# Patient Record
Sex: FEMALE | Race: BLACK OR AFRICAN AMERICAN | NOT HISPANIC OR LATINO | Employment: PART TIME | URBAN - METROPOLITAN AREA
[De-identification: names, ages, dates, MRNs, and addresses within clinical notes are randomized per-mention and may not be internally consistent; named-entity substitution may affect disease eponyms.]

---

## 2023-10-11 DIAGNOSIS — E11.69 TYPE 2 DIABETES MELLITUS WITH OTHER SPECIFIED COMPLICATION, WITHOUT LONG-TERM CURRENT USE OF INSULIN (MULTI): ICD-10-CM

## 2023-10-11 DIAGNOSIS — E11.69 TYPE 2 DIABETES MELLITUS WITH OTHER SPECIFIED COMPLICATION, WITH LONG-TERM CURRENT USE OF INSULIN (MULTI): Primary | ICD-10-CM

## 2023-10-11 DIAGNOSIS — Z79.4 TYPE 2 DIABETES MELLITUS WITH OTHER SPECIFIED COMPLICATION, WITH LONG-TERM CURRENT USE OF INSULIN (MULTI): Primary | ICD-10-CM

## 2023-10-12 RX ORDER — INSULIN GLARGINE 100 [IU]/ML
INJECTION, SOLUTION SUBCUTANEOUS
Qty: 25 ML | Refills: 3 | Status: SHIPPED | OUTPATIENT
Start: 2023-10-12 | End: 2024-01-25 | Stop reason: SDUPTHER

## 2024-01-08 ENCOUNTER — APPOINTMENT (OUTPATIENT)
Dept: OBSTETRICS AND GYNECOLOGY | Facility: CLINIC | Age: 39
End: 2024-01-08
Payer: COMMERCIAL

## 2024-01-08 ENCOUNTER — TELEPHONE (OUTPATIENT)
Dept: OBSTETRICS AND GYNECOLOGY | Facility: CLINIC | Age: 39
End: 2024-01-08
Payer: COMMERCIAL

## 2024-01-09 NOTE — PROGRESS NOTES
"HPI    37yo pmh dm2 (dx 2020), dyslipidemia, depression/anxiety, headaches presents in follow up.  Hasn't been seen for over 2 years.  A1c-10.1%.      No recent blood sugar tests.  No low sugars. Pt is doing better with carb control in the diet. Pt is not all that active.           Pt taking metformin 500mg ER bid for diabetes, can't tolerate 4 pills/day, lantus up to 70 units qd. No glp-1/Sgt2-i as pt not using any contraception and is sexually active.           Was taking atorvastatin 40mg daily for lipids and tolerating , not currently taking.           Was taking diazide and amlodipine for htn, currently not taking.           Mood is stable, not seeing a counselor currently.           Pt sexually active, no contraception, not planning pregnancy. Pt had taken ocp in the past, would be open to trying this again.           Seeing neurology for headaches, was given rx for taper of steroids, pt has not started yet.      Current Outpatient Medications:     blood sugar diagnostic strip, every 12 hours., Disp: , Rfl:     insulin glargine (Lantus Solostar U-100 Insulin) 100 unit/mL (3 mL) pen, USE 50 UNITS SUBCUTANEOUS EVERY DAY 30, Disp: 25 mL, Rfl: 3    lancets misc, every 12 hours., Disp: , Rfl:     metFORMIN  mg 24 hr tablet, Take 2 tablets (1,000 mg) by mouth once every 24 hours., Disp: , Rfl:     Mirena 21 mcg/24 hours (8 yrs) 52 mg IUD, , Disp: , Rfl:     OneTouch Ultra Test strip, 1 each 2 times a day., Disp: , Rfl:     pen needle, diabetic 32 gauge x 5/32\" needle, once every 24 hours., Disp: , Rfl:       Allergies as of 01/10/2024    (No Known Allergies)         Review of Systems   Cardiology: Lightheadedness-denies.  Chest pain-denies.  Leg edema-denies.  Palpitations-denies.  Respiratory: Cough-denies. Shortness of breath-denies.  Wheezing-denies.  Gastroenterology: Constipation-denies.  Diarrhea-at times.  Heartburn-denies.  Endocrinology: Cold intolerance-denies.  Heat intolerance-denies.  " "Sweats-denies.  Neurology: Headache-denies.  Tremor-denies.  Neuropathy in extremities-at times.  Psychology: Low energy-positive.  Irritability-denies.  Sleep disturbances-denies.      /70   Pulse 85   Wt 112 kg (248 lb)   BMI 36.62 kg/m²       Labs:  Lab Results   Component Value Date    WBC 8.2 04/23/2021    NRBC 0 04/23/2021    RBC 4.39 04/23/2021    HGB 13.3 04/23/2021    HCT 41.1 04/23/2021     04/23/2021     Lab Results   Component Value Date    CALCIUM 9.0 04/23/2021     04/23/2021    K 3.9 04/23/2021     04/23/2021    CO2 28 04/23/2021    ANIONGAP 8 04/23/2021    BUN 7 (L) 04/23/2021    CREATININE 0.6 04/23/2021    UREACREAUR 11.7 04/23/2021    GLUCOSE 128 (H) 04/23/2021    EGFR 121 04/23/2021     No results found for: \"CHOL\", \"TRIG\", \"HDL\", \"LDLCALC\"  No results found for: \"MICROALBCREA\"  Lab Results   Component Value Date    TSH 1.36 04/23/2021     Lab Results   Component Value Date    WCRHQSHH98 300 04/23/2021     Lab Results   Component Value Date    HGBA1C 10.1 (A) 01/10/2024         Physical Exam   General Appearance: pleasant, cooperative, no acute distress  HEENT: no chemosis, no proptosis, no lid lag, no lid retraction  Neck: no lymphadenopathy, no thyromegaly, no dominant thyroid nodules  Heart: no murmurs, regular rate and rhythm, S1 and S2  Lungs: no wheezes, no rhonci, no rales  Extremities: no lower extremity swelling      Assessment/Plan   1. Type 2 diabetes mellitus with other specified complication, unspecified whether long term insulin use (CMS/[facility])  -A1c order and reviewed  -sugars reviewed    -stop metformin  -once you have your IUD you can start:  -glimepiride 4mg q am  -add mounjaro 2.5mg weekly for 4 weeks, then 5mg weekly thereafter,  Went over risk/benefits/warnings/how to inject    -start libre2 and use her iphone (can call for training if she needs it)    2. Depression/anxiety  -would benefit from working with psych again    3. Mixed " hyperlipidemia  -needs to go back on statin, can revisit soon as we have made many changes  -will need labs in the future      Follow Up:  reese HelmsD  3 months    Medical Decision Making  Complexity of problem: Chronic illness of diabetes mellitus uncontrolled, progressing  Data analyzed and reviewed: Reviewed prior notes, blood glucose data, labs including HgbA1c, lipids, serum chemistries.  Ordered tests.   Risk of complications and morbidities: Is definite because of use of insulin and risk of hypoglycemia.  Prescription medications reviewed and modifications made.  Compliance assessed.  Addressed social determinants of health including food insecurity.

## 2024-01-10 ENCOUNTER — OFFICE VISIT (OUTPATIENT)
Dept: ENDOCRINOLOGY | Facility: CLINIC | Age: 39
End: 2024-01-10
Payer: COMMERCIAL

## 2024-01-10 VITALS
SYSTOLIC BLOOD PRESSURE: 132 MMHG | BODY MASS INDEX: 36.62 KG/M2 | WEIGHT: 248 LBS | DIASTOLIC BLOOD PRESSURE: 70 MMHG | HEART RATE: 85 BPM

## 2024-01-10 DIAGNOSIS — E11.69 TYPE 2 DIABETES MELLITUS WITH OTHER SPECIFIED COMPLICATION, UNSPECIFIED WHETHER LONG TERM INSULIN USE (MULTI): Primary | ICD-10-CM

## 2024-01-10 DIAGNOSIS — E78.2 MIXED HYPERLIPIDEMIA: ICD-10-CM

## 2024-01-10 DIAGNOSIS — I10 PRIMARY HYPERTENSION: ICD-10-CM

## 2024-01-10 LAB — POC HEMOGLOBIN A1C: 10.1 % (ref 4.2–6.5)

## 2024-01-10 PROCEDURE — 3078F DIAST BP <80 MM HG: CPT | Performed by: INTERNAL MEDICINE

## 2024-01-10 PROCEDURE — 3075F SYST BP GE 130 - 139MM HG: CPT | Performed by: INTERNAL MEDICINE

## 2024-01-10 PROCEDURE — 99214 OFFICE O/P EST MOD 30 MIN: CPT | Performed by: INTERNAL MEDICINE

## 2024-01-10 PROCEDURE — 83036 HEMOGLOBIN GLYCOSYLATED A1C: CPT | Performed by: INTERNAL MEDICINE

## 2024-01-10 RX ORDER — LEVONORGESTREL 52 MG/1
INTRAUTERINE DEVICE INTRAUTERINE
COMMUNITY
Start: 2024-01-09

## 2024-01-10 RX ORDER — METFORMIN HYDROCHLORIDE 500 MG/1
1000 TABLET, EXTENDED RELEASE ORAL EVERY 24 HOURS
COMMUNITY
End: 2024-01-18 | Stop reason: WASHOUT

## 2024-01-10 RX ORDER — PEN NEEDLE, DIABETIC 30 GX3/16"
NEEDLE, DISPOSABLE MISCELLANEOUS EVERY 24 HOURS
COMMUNITY
Start: 2021-02-03

## 2024-01-10 RX ORDER — GLIMEPIRIDE 4 MG/1
4 TABLET ORAL
Qty: 90 TABLET | Refills: 3 | Status: SHIPPED | OUTPATIENT
Start: 2024-01-10 | End: 2025-01-09

## 2024-01-10 RX ORDER — BLOOD SUGAR DIAGNOSTIC
1 STRIP MISCELLANEOUS 2 TIMES DAILY
COMMUNITY
End: 2024-01-18 | Stop reason: WASHOUT

## 2024-01-10 RX ORDER — TIRZEPATIDE 5 MG/.5ML
5 INJECTION, SOLUTION SUBCUTANEOUS
Qty: 2 ML | Refills: 11 | Status: SHIPPED | OUTPATIENT
Start: 2024-01-10 | End: 2024-04-11 | Stop reason: WASHOUT

## 2024-01-10 RX ORDER — FLASH GLUCOSE SENSOR
KIT MISCELLANEOUS
Qty: 2 EACH | Refills: 11 | Status: SHIPPED | OUTPATIENT
Start: 2024-01-10 | End: 2024-04-11 | Stop reason: ALTCHOICE

## 2024-01-10 RX ORDER — TIRZEPATIDE 2.5 MG/.5ML
2.5 INJECTION, SOLUTION SUBCUTANEOUS
Qty: 2 ML | Refills: 0 | Status: SHIPPED | OUTPATIENT
Start: 2024-01-10 | End: 2024-04-11 | Stop reason: WASHOUT

## 2024-01-10 RX ORDER — LANCETS
EACH MISCELLANEOUS EVERY 12 HOURS
COMMUNITY
Start: 2020-07-02 | End: 2024-01-18 | Stop reason: WASHOUT

## 2024-01-10 ASSESSMENT — ENCOUNTER SYMPTOMS
DEPRESSION: 0
OCCASIONAL FEELINGS OF UNSTEADINESS: 0
LOSS OF SENSATION IN FEET: 0

## 2024-01-10 ASSESSMENT — PATIENT HEALTH QUESTIONNAIRE - PHQ9
1. LITTLE INTEREST OR PLEASURE IN DOING THINGS: NOT AT ALL
SUM OF ALL RESPONSES TO PHQ9 QUESTIONS 1 AND 2: 1
2. FEELING DOWN, DEPRESSED OR HOPELESS: SEVERAL DAYS

## 2024-01-10 ASSESSMENT — PAIN SCALES - GENERAL: PAINLEVEL: 0-NO PAIN

## 2024-01-15 NOTE — PROGRESS NOTES
Kun is a 38 year old female for ROUTINE MEDICAL and has RECURRENT HA syndrome    SMOKES CIGARS daily 4 and wishes to DISCARD and START NICODERM PATCH    ROS is NEG for NAUSEA, VOMITING, DIARRHEA, CHEST PAIN, SOB, and BLEEDING and as further REVIEWED BELOW.    Subjective   Anna Ling is a 38 y.o. female who presents for ROUTINE MEDICAL.    HPI:    See above    Review of systems is essentially negative for all systems except for any identified issues in HPI above.    Objective     /80   Pulse 90   Temp 36.6 °C (97.8 °F) (Temporal)   SpO2 100%      COMPLETE PHYSICAL EXAM    GENERAL           General Appearance: pleasant, well-appearing, well-developed, well-hydrated, well-nourished, well-groomed, .        HEENT           NECK supple, Neg for adneopathy no thyroid enlargement or nodules, Oropharynx normal no exudates.        EYES           Pupils: PERRLA, no photophobia.        HEART           Rate and Rhythm regular rate and rhythm. Heart sounds: normal S1S2. Murmurs: none.        LUNGS           Effort: Normal chest wall, no pectus, Normal air entry all fields, Clear to IPPA, RR<16 with no use of accessory muscles.        BREASTS           Bilaterally: no masses, no nipple retraction, no nipple discharge, no abnormal skin changes. Axilla: no lymphadenopathy.        BACK           General: unremarkable, no spinal tenderness or rashes.        ABDOMEN           General: Normal to inspection, neg for LKKS or masses and BSs heard in all quadrants           RECTAL is negative for masses and HEME NEGATIVE.        LYMPHATICS           Cervical: none. Axillary: none.        MUSCULOSKELETAL           gross abnormalities no gross abnormalities, no joint redness or swelling.        EXTREMITIES           Varicose veins: not present. Pulses: 2+ bilateral. Clubbing: none. Cyanosis: no.        NEUROLOGICAL           Orientation: alert and oriented x 3. Grossly normal: yes. Plantars: downgoing bilaterally. Muscle  Bulk: normal . Cranial Nerves: CN's II-XII grossly intact.        PSYCHOLOGY           Affect: appropriate. Mood: pleasant.     Assessment/Plan   Problem List Items Addressed This Visit    None  Visit Diagnoses       Routine medical exam    -  Primary    Relevant Orders    CBC    Comprehensive metabolic panel    Microscopic Only, Urine    Screening, lipid        Relevant Orders    Lipid panel    Immunization counseling                FOLLOW UP:   YEARLY for ROUTINE MEDICAL and PRN for other issues as discussed in visit         Laura Lee M.D.

## 2024-01-18 ENCOUNTER — OFFICE VISIT (OUTPATIENT)
Dept: PRIMARY CARE | Facility: CLINIC | Age: 39
End: 2024-01-18
Payer: COMMERCIAL

## 2024-01-18 VITALS
OXYGEN SATURATION: 100 % | SYSTOLIC BLOOD PRESSURE: 132 MMHG | HEART RATE: 90 BPM | TEMPERATURE: 97.8 F | DIASTOLIC BLOOD PRESSURE: 80 MMHG

## 2024-01-18 DIAGNOSIS — Z79.4 TYPE 2 DIABETES MELLITUS WITHOUT COMPLICATION, WITH LONG-TERM CURRENT USE OF INSULIN (MULTI): ICD-10-CM

## 2024-01-18 DIAGNOSIS — R51.9 NONINTRACTABLE HEADACHE, UNSPECIFIED CHRONICITY PATTERN, UNSPECIFIED HEADACHE TYPE: ICD-10-CM

## 2024-01-18 DIAGNOSIS — Z00.00 ROUTINE MEDICAL EXAM: Primary | ICD-10-CM

## 2024-01-18 DIAGNOSIS — G93.0 ARACHNOID CYST: ICD-10-CM

## 2024-01-18 DIAGNOSIS — F17.200 SMOKING: ICD-10-CM

## 2024-01-18 DIAGNOSIS — R53.83 OTHER FATIGUE: ICD-10-CM

## 2024-01-18 DIAGNOSIS — Z71.85 IMMUNIZATION COUNSELING: ICD-10-CM

## 2024-01-18 DIAGNOSIS — E11.9 TYPE 2 DIABETES MELLITUS WITHOUT COMPLICATION, WITH LONG-TERM CURRENT USE OF INSULIN (MULTI): ICD-10-CM

## 2024-01-18 DIAGNOSIS — Z13.220 SCREENING, LIPID: ICD-10-CM

## 2024-01-18 PROCEDURE — 99395 PREV VISIT EST AGE 18-39: CPT | Performed by: FAMILY MEDICINE

## 2024-01-18 PROCEDURE — 3075F SYST BP GE 130 - 139MM HG: CPT | Performed by: FAMILY MEDICINE

## 2024-01-18 PROCEDURE — 3079F DIAST BP 80-89 MM HG: CPT | Performed by: FAMILY MEDICINE

## 2024-01-18 RX ORDER — IBUPROFEN 200 MG
1 TABLET ORAL EVERY 24 HOURS
Qty: 30 PATCH | Refills: 0 | Status: SHIPPED | OUTPATIENT
Start: 2024-01-18 | End: 2024-05-01 | Stop reason: WASHOUT

## 2024-01-18 ASSESSMENT — PAIN SCALES - GENERAL: PAINLEVEL: 0-NO PAIN

## 2024-01-18 ASSESSMENT — PATIENT HEALTH QUESTIONNAIRE - PHQ9
SUM OF ALL RESPONSES TO PHQ9 QUESTIONS 1 AND 2: 1
2. FEELING DOWN, DEPRESSED OR HOPELESS: SEVERAL DAYS
1. LITTLE INTEREST OR PLEASURE IN DOING THINGS: NOT AT ALL

## 2024-01-23 ENCOUNTER — APPOINTMENT (OUTPATIENT)
Dept: OBSTETRICS AND GYNECOLOGY | Facility: CLINIC | Age: 39
End: 2024-01-23
Payer: COMMERCIAL

## 2024-01-25 ENCOUNTER — APPOINTMENT (OUTPATIENT)
Dept: RADIOLOGY | Facility: HOSPITAL | Age: 39
End: 2024-01-25
Payer: COMMERCIAL

## 2024-01-25 ENCOUNTER — HOSPITAL ENCOUNTER (EMERGENCY)
Facility: HOSPITAL | Age: 39
Discharge: HOME | End: 2024-01-25
Attending: STUDENT IN AN ORGANIZED HEALTH CARE EDUCATION/TRAINING PROGRAM
Payer: COMMERCIAL

## 2024-01-25 VITALS
RESPIRATION RATE: 16 BRPM | HEIGHT: 69 IN | HEART RATE: 66 BPM | DIASTOLIC BLOOD PRESSURE: 60 MMHG | TEMPERATURE: 97.9 F | BODY MASS INDEX: 36.73 KG/M2 | WEIGHT: 248 LBS | OXYGEN SATURATION: 98 % | SYSTOLIC BLOOD PRESSURE: 104 MMHG

## 2024-01-25 DIAGNOSIS — Z79.4 TYPE 2 DIABETES MELLITUS WITH OTHER SPECIFIED COMPLICATION, WITH LONG-TERM CURRENT USE OF INSULIN (MULTI): ICD-10-CM

## 2024-01-25 DIAGNOSIS — E11.69 TYPE 2 DIABETES MELLITUS WITH OTHER SPECIFIED COMPLICATION, WITH LONG-TERM CURRENT USE OF INSULIN (MULTI): ICD-10-CM

## 2024-01-25 DIAGNOSIS — G43.909 MIGRAINE WITHOUT STATUS MIGRAINOSUS, NOT INTRACTABLE, UNSPECIFIED MIGRAINE TYPE: Primary | ICD-10-CM

## 2024-01-25 PROCEDURE — 2500000004 HC RX 250 GENERAL PHARMACY W/ HCPCS (ALT 636 FOR OP/ED): Performed by: PHYSICIAN ASSISTANT

## 2024-01-25 PROCEDURE — 96361 HYDRATE IV INFUSION ADD-ON: CPT

## 2024-01-25 PROCEDURE — 96374 THER/PROPH/DIAG INJ IV PUSH: CPT

## 2024-01-25 PROCEDURE — 2500000004 HC RX 250 GENERAL PHARMACY W/ HCPCS (ALT 636 FOR OP/ED): Performed by: STUDENT IN AN ORGANIZED HEALTH CARE EDUCATION/TRAINING PROGRAM

## 2024-01-25 PROCEDURE — 70450 CT HEAD/BRAIN W/O DYE: CPT

## 2024-01-25 PROCEDURE — 99284 EMERGENCY DEPT VISIT MOD MDM: CPT | Mod: 25,27 | Performed by: STUDENT IN AN ORGANIZED HEALTH CARE EDUCATION/TRAINING PROGRAM

## 2024-01-25 PROCEDURE — 96375 TX/PRO/DX INJ NEW DRUG ADDON: CPT

## 2024-01-25 RX ORDER — METOCLOPRAMIDE HYDROCHLORIDE 5 MG/ML
10 INJECTION INTRAMUSCULAR; INTRAVENOUS ONCE
Status: COMPLETED | OUTPATIENT
Start: 2024-01-25 | End: 2024-01-25

## 2024-01-25 RX ORDER — DIPHENHYDRAMINE HYDROCHLORIDE 50 MG/ML
25 INJECTION INTRAMUSCULAR; INTRAVENOUS ONCE
Status: COMPLETED | OUTPATIENT
Start: 2024-01-25 | End: 2024-01-25

## 2024-01-25 RX ORDER — KETOROLAC TROMETHAMINE 30 MG/ML
15 INJECTION, SOLUTION INTRAMUSCULAR; INTRAVENOUS ONCE
Status: COMPLETED | OUTPATIENT
Start: 2024-01-25 | End: 2024-01-25

## 2024-01-25 RX ORDER — INSULIN GLARGINE 100 [IU]/ML
INJECTION, SOLUTION SUBCUTANEOUS
Qty: 25 ML | Refills: 3 | Status: SHIPPED | OUTPATIENT
Start: 2024-01-25

## 2024-01-25 RX ORDER — DEXAMETHASONE SODIUM PHOSPHATE 100 MG/10ML
10 INJECTION INTRAMUSCULAR; INTRAVENOUS ONCE
Status: COMPLETED | OUTPATIENT
Start: 2024-01-25 | End: 2024-01-25

## 2024-01-25 RX ADMIN — DEXAMETHASONE SODIUM PHOSPHATE 10 MG: 10 INJECTION INTRAMUSCULAR; INTRAVENOUS at 10:03

## 2024-01-25 RX ADMIN — DIPHENHYDRAMINE HYDROCHLORIDE 25 MG: 50 INJECTION, SOLUTION INTRAMUSCULAR; INTRAVENOUS at 09:03

## 2024-01-25 RX ADMIN — SODIUM CHLORIDE 1000 ML: 900 INJECTION, SOLUTION INTRAVENOUS at 10:04

## 2024-01-25 RX ADMIN — SODIUM CHLORIDE 1000 ML: 900 INJECTION, SOLUTION INTRAVENOUS at 08:46

## 2024-01-25 RX ADMIN — METOCLOPRAMIDE 10 MG: 5 INJECTION, SOLUTION INTRAMUSCULAR; INTRAVENOUS at 09:02

## 2024-01-25 RX ADMIN — KETOROLAC TROMETHAMINE 15 MG: 30 INJECTION INTRAMUSCULAR; INTRAVENOUS at 10:04

## 2024-01-25 ASSESSMENT — PAIN DESCRIPTION - DESCRIPTORS: DESCRIPTORS: PRESSURE

## 2024-01-25 ASSESSMENT — PAIN SCALES - GENERAL: PAINLEVEL_OUTOF10: 9

## 2024-01-25 ASSESSMENT — PAIN DESCRIPTION - PROGRESSION: CLINICAL_PROGRESSION: NOT CHANGED

## 2024-01-25 ASSESSMENT — COLUMBIA-SUICIDE SEVERITY RATING SCALE - C-SSRS
1. IN THE PAST MONTH, HAVE YOU WISHED YOU WERE DEAD OR WISHED YOU COULD GO TO SLEEP AND NOT WAKE UP?: NO
2. HAVE YOU ACTUALLY HAD ANY THOUGHTS OF KILLING YOURSELF?: NO
6. HAVE YOU EVER DONE ANYTHING, STARTED TO DO ANYTHING, OR PREPARED TO DO ANYTHING TO END YOUR LIFE?: NO

## 2024-01-25 ASSESSMENT — PAIN DESCRIPTION - FREQUENCY: FREQUENCY: CONSTANT/CONTINUOUS

## 2024-01-25 ASSESSMENT — PAIN - FUNCTIONAL ASSESSMENT: PAIN_FUNCTIONAL_ASSESSMENT: 0-10

## 2024-01-25 ASSESSMENT — PAIN DESCRIPTION - LOCATION: LOCATION: HEAD

## 2024-01-25 ASSESSMENT — PAIN DESCRIPTION - PAIN TYPE: TYPE: CHRONIC PAIN

## 2024-01-25 ASSESSMENT — PAIN DESCRIPTION - ONSET: ONSET: GRADUAL

## 2024-01-25 NOTE — DISCHARGE INSTRUCTIONS
Please follow-up with your primary care doctor in the next 1 to 3 days.  Please follow-up with neurology.  If you do not have a neurologist, one is listed on your discharge paperwork to follow-up with.  A referral was placed to Dr. Snider's office.    Please return to the emergency department new or worsening symptoms with the onset of new symptoms.

## 2024-01-25 NOTE — ED PROVIDER NOTES
HPI   Chief Complaint   Patient presents with    Headache       38-year-old female who presents to the emergency department with complaints of headache x 2 weeks.  Patient states that the headache has been going on for the last 2 weeks.  She will find short periods of alleviation with Tylenol or ibuprofen but the headache has come back.  She states that it is in her frontal part of her head wrapping around both sides of her face.  She admits to associated photosensitivity.  No nausea, vomiting.  She states that today she started to feel lightheaded with the headache which prompted her to come to the emergency department.  She states that this headache today is more intense than her past headaches.  She has a history of a arachnoid cyst.  She has a MRI scheduled for next month.       Please see HPI for pertinent positive and negative ROS.                     No data recorded                Patient History   Past Medical History:   Diagnosis Date    Atypical squamous cells of undetermined significance on cytologic smear of cervix (ASC-US) 06/20/2013    Pap smear abnormality of cervix with ASCUS favoring benign    Chlamydial infection, unspecified     Disease due to chlamydiae    Controlled type 2 diabetes mellitus with hyperglycemia, without long-term current use of insulin (CMS/[facility])     Encounter for gynecological examination (general) (routine) without abnormal findings     Encounter for routine gynecological examination    Essential (primary) hypertension     Mixed hyperlipidemia     Other conditions influencing health status     Cervical Dysplasia    Other conditions influencing health status     Cervical Intraepithelial Glandular Neoplasia    Personal history of other diseases of the nervous system and sense organs     History of blurred vision     Past Surgical History:   Procedure Laterality Date    CERVICAL BIOPSY  W/ LOOP ELECTRODE EXCISION  06/20/2013    Cervical Loop Electrosurgical Excision (LEEP)     COLPOSCOPY  06/20/2013    Colposcopy    OTHER SURGICAL HISTORY  06/20/2013    Colposcopy With Loop Electrode Excision Of The Cervix     No family history on file.  Social History     Tobacco Use    Smoking status: Every Day     Types: Cigars    Smokeless tobacco: Never    Tobacco comments:     3 cigars   Substance Use Topics    Alcohol use: Yes     Comment: rare    Drug use: Defer       Physical Exam   ED Triage Vitals [01/25/24 0827]   Temperature Heart Rate Respirations BP   36.4 °C (97.5 °F) 79 17 (!) 132/91      Pulse Ox Temp Source Heart Rate Source Patient Position   99 % Temporal -- Sitting      BP Location FiO2 (%)     Right arm --       Physical Exam  GENERAL APPEARANCE: Awake and alert. No acute distress.   VITAL SIGNS: As per the nurses' triage record.  HEENT: Normocephalic, atraumatic. Extraocular muscles are intact.  No vertical or horizontal nystagmus bilaterally.  PERRLA bilaterally.  Conjunctiva are pink. Negative scleral icterus. Mucous membranes are moist. Tongue in the midline. Oropharynx clear, uvula midline.  Tonsillar hypertrophy or exudate bilaterally.  TMs gray and intact bilaterally.   NECK: Soft, nontender and supple, full gross ROM, no meningeal signs.  CHEST: Nontender to palpation. Clear to auscultation bilaterally. No rales, rhonchi, or wheezing. Symmetric rise and fall of chest wall.   HEART: Clear S1 and S2. Regular rate and rhythm. No murmurs appreciated on auscultation.  Strong and equal pulses in the extremities.  ABDOMEN: Soft, nontender, nondistended, positive bowel sounds, no palpable masses.  MUSCULOSKELETAL: The calves are nontender to palpation. Full gross active range of motion. Ambulating on own with no acute difficulties  NEUROLOGICAL: Awake, alert and oriented x 3. Motor power intact in the upper and lower extremities. Sensation is intact to light touch in the upper and lower extremities. Patient answering questions appropriately.  Cranial nerves II through XII grossly  intact bilaterally.  No ataxia with finger-to-nose.  IMMUNOLOGICAL: No lymphatic streaking noted  DERMATOLOGIC: Warm and dry without petechiae, rashes, or ecchymosis noted on visible skin.   PYSCH: Cooperative with appropriate mood and affect.  ED Course & MDM   ED Course as of 01/25/24 1111   u Jan 25, 2024   0957 The scan of head without IV contrast shows no acute findings.  The patient saying that she still has a headache, ordered more fluids, Decadron 10 mg and Toradol 15 mg IV.  Will reassess the patient after these medications. [SC]      ED Course User Index  [SC] Rosie Mendoza PA-C         Diagnoses as of 01/25/24 1111   Migraine without status migrainosus, not intractable, unspecified migraine type       Medical Decision Making  Parts of this chart have been completed using voice recognition software. Please excuse any errors of transcription.  My thought process and reason for plan has been formulated from the time that I saw the patient until the time of disposition and is not specific to one specific moment during their visit and furthermore my MDM encompasses this entire chart and not only this text box.      HPI: Detailed above.    Exam: A medically appropriate exam performed, outlined above, given the known history and presentation.    History obtained from: Patient    Social Determinants of Health considered during this visit: Lives at home    Medications given during visit:  Medications   sodium chloride 0.9 % bolus 1,000 mL (1,000 mL intravenous New Bag 1/25/24 0846)   diphenhydrAMINE (BENADryl) injection 25 mg (25 mg intravenous Given 1/25/24 0903)   metoclopramide (Reglan) injection 10 mg (10 mg intravenous Given 1/25/24 0902)   ketorolac (Toradol) injection 15 mg (15 mg intravenous Given 1/25/24 1004)   dexAMETHasone (Decadron) injection 10 mg (10 mg intravenous Given 1/25/24 1003)   sodium chloride 0.9 % bolus 1,000 mL (1,000 mL intravenous New Bag 1/25/24 1004)         Diagnostic/tests  Labs Reviewed - No data to display   CT head wo IV contrast   Final Result   No CT evidence for acute intracranial pathology. Stable appearing   examination.        Signed by: Gregg Byrd 1/25/2024 9:37 AM   Dictation workstation:   [id number]           Considerations/further MDM:  Patient was seen in conjucntion with my supervising physician,  Dr. Ovalle. Please refer to his note.    Considered meningitis, migraine headache, intracranial mass, intracranial bleed, changes in known arachnoid cyst,    Due to changing/worsening headache with known arachnoid cyst, proceeded with CT scan of head without IV contrast.  IV Reglan, IV Benadryl and IV normal saline ordered.    CT scan of head shows no acute intracranial pathology and no changes of known arachnoid cyst.  Patients symptoms improved after IV fluids, IV Benadryl and IV Reglan.  Therefore, proceeded to order IV dexamethasone and IV Toradol.  Patient states that her symptoms greatly improved after IV Toradol and IV dexamethasone.  She states that her light sensitivity greatly improved.  That her headache pain improved greatly.    She was discharged with the instructions to follow-up with her primary care doctor in the next 1 to 3 days.  She was educated to follow-up with neurology.  She has an appoint with neurology on February 21, 2023.    Patient is to return to the emergency department with new or worsening symptoms with the onset of new symptoms.  Patient verbalized understanding of discharge instructions was comfortable discharge plan home.  She was released in good condition.    Procedure  Procedures     Rosie Mendoza PA-C  01/25/24 1113

## 2024-01-25 NOTE — ED TRIAGE NOTES
Headache x 2 weeks. MRI scheduled 2/21/24.  Lightheaded and dizzy, Aox4, no falls, no cp, no dyspnea. EKG at 0825, no new meds/no change in medications. Neg cinci

## 2024-02-03 ENCOUNTER — HOSPITAL ENCOUNTER (EMERGENCY)
Facility: HOSPITAL | Age: 39
Discharge: LEFT BEFORE DISCHARGE | End: 2024-02-03
Attending: EMERGENCY MEDICINE
Payer: COMMERCIAL

## 2024-02-03 VITALS
SYSTOLIC BLOOD PRESSURE: 132 MMHG | RESPIRATION RATE: 18 BRPM | OXYGEN SATURATION: 97 % | WEIGHT: 248 LBS | HEART RATE: 104 BPM | HEIGHT: 69 IN | DIASTOLIC BLOOD PRESSURE: 78 MMHG | TEMPERATURE: 98.2 F | BODY MASS INDEX: 36.73 KG/M2

## 2024-02-03 PROCEDURE — 4500999001 HC ED NO CHARGE

## 2024-02-03 PROCEDURE — 99281 EMR DPT VST MAYX REQ PHY/QHP: CPT | Performed by: EMERGENCY MEDICINE

## 2024-02-03 ASSESSMENT — PAIN DESCRIPTION - ONSET: ONSET: ONGOING

## 2024-02-03 ASSESSMENT — PAIN DESCRIPTION - DESCRIPTORS
DESCRIPTORS: ACHING;NUMBNESS
DESCRIPTORS_2: SHARP;ACHING

## 2024-02-03 ASSESSMENT — PAIN DESCRIPTION - PAIN TYPE: TYPE: ACUTE PAIN

## 2024-02-03 ASSESSMENT — COLUMBIA-SUICIDE SEVERITY RATING SCALE - C-SSRS
6. HAVE YOU EVER DONE ANYTHING, STARTED TO DO ANYTHING, OR PREPARED TO DO ANYTHING TO END YOUR LIFE?: NO
1. IN THE PAST MONTH, HAVE YOU WISHED YOU WERE DEAD OR WISHED YOU COULD GO TO SLEEP AND NOT WAKE UP?: NO
2. HAVE YOU ACTUALLY HAD ANY THOUGHTS OF KILLING YOURSELF?: NO

## 2024-02-03 ASSESSMENT — PAIN - FUNCTIONAL ASSESSMENT: PAIN_FUNCTIONAL_ASSESSMENT: 0-10

## 2024-02-03 ASSESSMENT — PAIN DESCRIPTION - LOCATION
LOCATION_2: BACK
LOCATION: ARM

## 2024-02-03 ASSESSMENT — PAIN DESCRIPTION - PROGRESSION: CLINICAL_PROGRESSION: NOT CHANGED

## 2024-02-03 ASSESSMENT — PAIN SCALES - GENERAL
PAINLEVEL_OUTOF10: 8
PAINLEVEL_OUTOF10: 10 - WORST POSSIBLE PAIN

## 2024-02-03 ASSESSMENT — PAIN DESCRIPTION - FREQUENCY: FREQUENCY: CONSTANT/CONTINUOUS

## 2024-02-05 ENCOUNTER — TELEPHONE (OUTPATIENT)
Dept: PRIMARY CARE | Facility: CLINIC | Age: 39
End: 2024-02-05
Payer: COMMERCIAL

## 2024-02-05 ENCOUNTER — TELEMEDICINE (OUTPATIENT)
Dept: PRIMARY CARE | Facility: CLINIC | Age: 39
End: 2024-02-05
Payer: COMMERCIAL

## 2024-02-05 ENCOUNTER — TELEPHONE (OUTPATIENT)
Dept: ENDOCRINOLOGY | Facility: CLINIC | Age: 39
End: 2024-02-05

## 2024-02-05 DIAGNOSIS — G93.0 ARACHNOID CYST: ICD-10-CM

## 2024-02-05 DIAGNOSIS — E11.69 TYPE 2 DIABETES MELLITUS WITH OTHER SPECIFIED COMPLICATION, UNSPECIFIED WHETHER LONG TERM INSULIN USE (MULTI): Primary | ICD-10-CM

## 2024-02-05 DIAGNOSIS — F40.240 CLAUSTROPHOBIA: ICD-10-CM

## 2024-02-05 DIAGNOSIS — R51.9 NONINTRACTABLE HEADACHE, UNSPECIFIED CHRONICITY PATTERN, UNSPECIFIED HEADACHE TYPE: ICD-10-CM

## 2024-02-05 DIAGNOSIS — Z71.9 HEALTH COUNSELING: Primary | ICD-10-CM

## 2024-02-05 PROCEDURE — 99442 PR PHYS/QHP TELEPHONE EVALUATION 11-20 MIN: CPT | Performed by: FAMILY MEDICINE

## 2024-02-05 RX ORDER — LORAZEPAM 0.5 MG/1
.5-1 TABLET ORAL SEE ADMIN INSTRUCTIONS
Qty: 2 TABLET | Refills: 0 | Status: SHIPPED | OUTPATIENT
Start: 2024-02-05 | End: 2024-05-01 | Stop reason: WASHOUT

## 2024-02-05 RX ORDER — DULAGLUTIDE 0.75 MG/.5ML
0.75 INJECTION, SOLUTION SUBCUTANEOUS
Qty: 2 ML | Refills: 1 | Status: SHIPPED | OUTPATIENT
Start: 2024-02-05 | End: 2024-03-27

## 2024-02-05 RX ORDER — DULAGLUTIDE 1.5 MG/.5ML
1.5 INJECTION, SOLUTION SUBCUTANEOUS
Qty: 2 ML | Refills: 5 | Status: SHIPPED | OUTPATIENT
Start: 2024-02-05 | End: 2024-04-11 | Stop reason: ALTCHOICE

## 2024-02-05 NOTE — TELEPHONE ENCOUNTER
Pt left VM stating that  was supposed to sent the pt Rx for her claustrophobia for the pt's scheduled MRI coming up.

## 2024-02-05 NOTE — PROGRESS NOTES
This is a 38 year old female for IMPENDING MRI study and due to CLAUSTROPHOBIA she is requesting sedative.  She is having MR study Feb 21st and is able to be escorted to and from.        She is adv she will need  to and from and to remain off work for 24 hours after procedure to be sure to clear the sedation.

## 2024-02-05 NOTE — TELEPHONE ENCOUNTER
Patient's insurance denied Mounjaro she has to try 3 alternatives Trulicity,Victoza, Byetta,farxiga,jardiance or invokana

## 2024-02-09 ENCOUNTER — PROCEDURE VISIT (OUTPATIENT)
Dept: OBSTETRICS AND GYNECOLOGY | Facility: CLINIC | Age: 39
End: 2024-02-09
Payer: COMMERCIAL

## 2024-02-09 VITALS — BODY MASS INDEX: 36.79 KG/M2 | WEIGHT: 249.1 LBS

## 2024-02-09 DIAGNOSIS — Z30.430 ENCOUNTER FOR INSERTION OF MIRENA IUD: ICD-10-CM

## 2024-02-09 LAB — PREGNANCY TEST URINE, POC: NEGATIVE

## 2024-02-09 PROCEDURE — 58300 INSERT INTRAUTERINE DEVICE: CPT | Performed by: OBSTETRICS & GYNECOLOGY

## 2024-02-09 PROCEDURE — 2500000004 HC RX 250 GENERAL PHARMACY W/ HCPCS (ALT 636 FOR OP/ED): Mod: MUE | Performed by: OBSTETRICS & GYNECOLOGY

## 2024-02-09 PROCEDURE — 81025 URINE PREGNANCY TEST: CPT | Performed by: OBSTETRICS & GYNECOLOGY

## 2024-02-09 PROCEDURE — 2500000004 HC RX 250 GENERAL PHARMACY W/ HCPCS (ALT 636 FOR OP/ED): Performed by: OBSTETRICS & GYNECOLOGY

## 2024-02-09 RX ADMIN — LEVONORGESTREL 1 EACH: 52 INTRAUTERINE DEVICE INTRAUTERINE at 13:34

## 2024-02-09 ASSESSMENT — ENCOUNTER SYMPTOMS
LOSS OF SENSATION IN FEET: 0
DEPRESSION: 0
OCCASIONAL FEELINGS OF UNSTEADINESS: 0

## 2024-02-09 ASSESSMENT — PATIENT HEALTH QUESTIONNAIRE - PHQ9
1. LITTLE INTEREST OR PLEASURE IN DOING THINGS: NOT AT ALL
2. FEELING DOWN, DEPRESSED OR HOPELESS: NOT AT ALL
SUM OF ALL RESPONSES TO PHQ9 QUESTIONS 1 AND 2: 0

## 2024-02-09 ASSESSMENT — PAIN SCALES - GENERAL: PAINLEVEL: 10-WORST PAIN EVER

## 2024-02-09 NOTE — PROGRESS NOTES
Patient ID: Anna Ling is a 38 y.o. female.    IUD Insertion    Date/Time: 2/9/2024 9:15 AM    Performed by: Margot Lee DO  Authorized by: Margot Lee DO    Consent:     Consent obtained:  Written    Consent given by:  Patient    Procedure risks and benefits discussed: yes      Patient questions answered: yes      Patient agrees, verbalizes understanding, and wants to proceed: yes      Educational handouts given: yes      Instructions and paperwork completed: yes    Universal protocol:     Patient states understanding of procedure being performed: yes      Relevant documents present and verified: yes      Required blood products, implants, devices, and special equipment available: yes    Procedure:     Pelvic exam performed: yes      Negative urine pregnancy test: yes      Cervix cleaned and prepped: yes      Speculum placed in vagina: yes      Tenaculum applied to cervix: yes      Uterus sounded: yes      Uterus sound depth (cm):  7    IUD inserted with no complications: yes      IUD type:  Mirena    Strings trimmed: yes    Post-procedure:     Patient will follow up after next period: yes

## 2024-02-21 ENCOUNTER — OFFICE VISIT (OUTPATIENT)
Dept: NEUROLOGY | Facility: HOSPITAL | Age: 39
End: 2024-02-21
Payer: COMMERCIAL

## 2024-02-21 VITALS
TEMPERATURE: 97.5 F | HEIGHT: 69 IN | WEIGHT: 249 LBS | BODY MASS INDEX: 36.88 KG/M2 | DIASTOLIC BLOOD PRESSURE: 81 MMHG | HEART RATE: 88 BPM | RESPIRATION RATE: 18 BRPM | SYSTOLIC BLOOD PRESSURE: 136 MMHG

## 2024-02-21 DIAGNOSIS — G43.E19 INTRACTABLE CHRONIC MIGRAINE WITH AURA AND WITHOUT STATUS MIGRAINOSUS: Primary | ICD-10-CM

## 2024-02-21 DIAGNOSIS — G93.0 ARACHNOID CYST: ICD-10-CM

## 2024-02-21 PROCEDURE — 99204 OFFICE O/P NEW MOD 45 MIN: CPT | Performed by: PSYCHIATRY & NEUROLOGY

## 2024-02-21 PROCEDURE — 99214 OFFICE O/P EST MOD 30 MIN: CPT | Mod: GC | Performed by: PSYCHIATRY & NEUROLOGY

## 2024-02-21 RX ORDER — TOPIRAMATE 25 MG/1
50 TABLET ORAL NIGHTLY
Qty: 30 TABLET | Refills: 2 | Status: SHIPPED | OUTPATIENT
Start: 2024-02-21 | End: 2025-02-20

## 2024-02-21 ASSESSMENT — PAIN SCALES - GENERAL: PAINLEVEL: 8

## 2024-02-21 NOTE — PATIENT INSTRUCTIONS
It was a pleasure meeting you today Ms. Ling.     After hearing your story and examining you, we believe what you have is chronic migraine.     What we would recommend to:   Avoid taking Excedrin or any pain meds in daily bases. Limit your use of headache to 2 tabs per week.     Switch to Magnesium Oxide instead, which is an over the counter supplements. You can either take it daily or when you have bad headaches.     Will start  preventive medication called  Topamax. We will start a low dose 25 mg at bedtime. We can increase the dose up to 50 mg at bedtime after a week. Note that known side effects are kidney stones, glaucoma, and weight loss.     One more thing that we are suspecting that might be contributing to your headaches is obstructive sleep apnea. Which is supported by the fact that you snore during sleep and feeling un-refreshed in the daytime. We will request a referral to sleep medicine for evaluation.     [facility] 5th Floor, [facility], [address] [place] [address]  Phone: [phone]    We will see you for follow up in 3 months.     Odin Davila MD  Neurology Resident PGY3

## 2024-02-22 NOTE — PROGRESS NOTES
"Neurology Clinic Note   First Visit     Chief Complaint: Headaches     History of Present Illness:   Ms. Ling is a 38 y.o. right handed woman who is referred to the Neurology Clinic for for headaches.     Ms. Ling is a known case of T2DM and HTN, who was in her usual state of health until early January of 2024, when she developed daily constant headache.     She describes the headache as holocephalic, throbbing, fluctuates in severity between 5/10 to 9/10. Associated with photophobia, but no phonophobia, and no nausea or vomiting. She reports that they have been constant, never had any headache-free days. She wakes up with headache 3-5/10 in severity. They have no specific triggers, but can worsen up to 7-9/10. She uses Excedrin 3-4 times per week, and it helps bring the headache down to 2/10 for couple of hours.     She denied any relation to position, or valsalva maneuvers.     She denied any visual symptoms, pulsatile tinnitus, weakness, nor sensory changes. No history of fever, weight loss or recent weight changes, and no loss of appetite. No recent medications changes, and she is on IUD for contraception.     Per chart review, she had multiple ED visit and head/brain imaging for \"headache\". 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    Patient also reported history of snoring at night and daytime sleepiness.     Relevant past medical, surgical, family, and social histories, along with ROS was reviewed and pertinent details noted above.     Past Medical History:   Diagnosis Date    Atypical squamous cells of undetermined significance on cytologic smear of cervix (ASC-US) 06/20/2013    Pap smear abnormality of cervix with ASCUS favoring benign    Chlamydial infection, unspecified     Disease due to chlamydiae    Controlled type 2 diabetes mellitus with hyperglycemia, without long-term current use of insulin (CMS/Formerly [facility])     Encounter for " gynecological examination (general) (routine) without abnormal findings     Encounter for routine gynecological examination    Essential (primary) hypertension     Mixed hyperlipidemia     Other conditions influencing health status     Cervical Dysplasia    Other conditions influencing health status     Cervical Intraepithelial Glandular Neoplasia    Personal history of other diseases of the nervous system and sense organs     History of blurred vision     Past Surgical History:   Procedure Laterality Date    CERVICAL BIOPSY  W/ LOOP ELECTRODE EXCISION  06/20/2013    Cervical Loop Electrosurgical Excision (LEEP)    COLPOSCOPY  06/20/2013    Colposcopy    OTHER SURGICAL HISTORY  06/20/2013    Colposcopy With Loop Electrode Excision Of The Cervix     No family history on file.  Social History     Tobacco Use    Smoking status: Every Day     Types: Cigars    Smokeless tobacco: Never    Tobacco comments:     3 cigars   Substance Use Topics    Alcohol use: Yes     Comment: rare      No Known Allergies     Medications:    Current Outpatient Medications:     dulaglutide (Trulicity) 0.75 mg/0.5 mL pen injector, Inject 0.75 mg under the skin 1 (one) time per week., Disp: 2 mL, Rfl: 1    dulaglutide (Trulicity) 1.5 mg/0.5 mL pen injector injection, Inject 1.5 mg under the skin 1 (one) time per week., Disp: 2 mL, Rfl: 5    FreeStyle Bill sensor system (FreeStyle Bill 2 Sensor) kit, -use every 14 days, Disp: 2 each, Rfl: 11    glimepiride (AmaryL) 4 mg tablet, Take 1 tablet (4 mg) by mouth once daily in the morning. Take before meals., Disp: 90 tablet, Rfl: 3    insulin glargine (Lantus Solostar U-100 Insulin) 100 unit/mL (3 mL) pen, USE 50 UNITS SUBCUTANEOUS EVERY DAY 30, Disp: 25 mL, Rfl: 3    LORazepam (Ativan) 0.5 mg tablet, Take 1-2 tablets (0.5-1 mg) by mouth see administration instructions. Take 30 minutes prior to procedure., Disp: 2 tablet, Rfl: 0    Mirena 21 mcg/24 hours (8 yrs) 52 mg IUD, , Disp: , Rfl:      "nicotine (Nicoderm CQ) 21 mg/24 hr patch, Place 1 patch over 24 hours on the skin once every 24 hours., Disp: 30 patch, Rfl: 0    pen needle, diabetic 32 gauge x 5/32\" needle, once every 24 hours., Disp: , Rfl:     tirzepatide (Mounjaro) 2.5 mg/0.5 mL pen injector, Inject 2.5 mg under the skin every 7 days., Disp: 2 mL, Rfl: 0    tirzepatide (Mounjaro) 5 mg/0.5 mL pen injector, Inject 5 mg under the skin every 7 days., Disp: 2 mL, Rfl: 11    topiramate (Topamax) 25 mg tablet, Take 2 tablets (50 mg) by mouth once daily at bedtime., Disp: 30 tablet, Rfl: 2       Physical Exam:  /81   Pulse 88   Temp 36.4 °C (97.5 °F)   Resp 18   Ht 1.753 m (5' 9\")   Wt 113 kg (249 lb)   LMP 01/18/2024   BMI 36.77 kg/m²      General Appearance:  No distress, alert, interactive and cooperative.   Cardiovascular: Regular, rate and rhythm    Neurological:  Mental status: the patient provided an accurate history, language was normal.     Cranial Nerves:  CN 2   Visual fields full to confrontation.   Fundoscopy with clear disc margins bilaterally.   CN 3, 4, 6   Pupils round, 4 mm in diameter, equally reactive to light.   Lids symmetric; no ptosis.   EOMs normal alignment, full range, with normal pursuit and convergence  No nystagmus.   CN 5   Facial sensation intact bilaterally.   CN 7   Normal and symmetric facial strength. Nasolabial folds symmetric.   Able to lift eyebrows and close eye lids with eye lashes buried symmetrically.   CN 8   Hearing intact to conversation.     CN 9, 10   Palate elevates symmetrically.   Phonation within normal limits, no dysarthria.   CN 11   Normal strength of shoulder shrug and neck turning.   CN 12   Tongue midline, with normal bulk and strength; no fasciculations.     Motor:   Muscle bulk: Normal throughout.  Muscle tone: Normal in both upper and lower extremities.    R  L   5  5 Shoulder abduction  5  5 Elbow flexion  5  5 Elbow extension  5  5 Wrist flexion  5  5 Wrist extension  5  5 " Hand     5  5 Hip flexion  5  5 Knee flexion  5  5 Knee extension  5  5 Ankle dorsiflexion  5  5 Ankle plantarflexion    Reflexes:                       R          L  BR:               2          2  Biceps:         2          2  Triceps:        2          2  Knee:           2          2  Ankle:          Tr         Tr    Babinski: Toes are down going  Romero's: Not present    Sensory:   In both upper and lower extremities, sensation was intact to light touch    Coordination:    In both upper extremities, finger-nose-finger was intact without dysmetria or overshoot.   In both lower extremities, heel-to-shin was intact  SONJA were intact in both upper and lower extremities.      Gait:   Station was stable with a normal base. Gait was stable with a normal arm swing and speed. No ataxia, shuffling, steppage or waddling was present. No circumduction was present.   Tandem gait was intact.   No Romberg sign was present.       Results:    The following labs, imaging, and results were personally reviewed and demonstrated:    MRI brain 3/23/2023:   Large extra-axial fluid collection is noted in the cisterna vellum interpositum which follows the signal intensity characteristics of CSF compatible with an arachnoid cyst approximately 4.2 x 4.0 x 3.4 cm in greatest AP, transverse, and CC dimensions, respectively. This accounts for severe compression of the dorsal bodies of lateral ventricles, mild compression of the atria, mild compression of the superior colliculus, caudal displacement of the internal cerebral veins and lateral and superior displacement of the fornices but there is no obstructive hydrocephalus.  No evidence of an intracranial mass elsewhere. No evidence of an extra-axial fluid collection.     CTH 1/25/2024:   Cavum velum interpositum versus arachnoid cyst is again noted, unchanged over multiple prior studies. No focal mass effect or midline shift is identified.     Impression/Plan:    A 38-year-old woman with  PMH notable for T2DM and HTN, who presents to the Neurology Clinic for 2 months history of daily headache. Headaches are holocephalic, throbbing, associated with phonophobia, and fluctuates between 5-9/10 in severity, with almost complete response to Excedrin for couple of hours. Physical examination is unremarkable. CTH obtained on 1/25/24 and MRI brain on 3/23/23 personally reviewed and was notable for a intraventricular arachnoid cyst, which is stable when compared to prior head imaging.     Picture is most consistent with chronic migraine headache, +/- complicated by medication overuse (Excedrin for 3-4 days/week). Given history of snoring and daytime sleepiness, underlying GAIL could be contributing to the picture as well.     Impression: Chronic migraine, without aura    Plan:  - Start Topamax at 25 mg at bedtime for 1 week, then 50 mg at bedtime  - Magnesium oxide supplements   - Limit Excedrin use to sever attacks no more than 2/week   - Referral to sleep medicine for possible GAIL   - Follow up in 2-3 months    Odin Davila MD  Neurology Resident PGY3

## 2024-03-01 ENCOUNTER — LAB (OUTPATIENT)
Dept: LAB | Facility: LAB | Age: 39
End: 2024-03-01
Payer: COMMERCIAL

## 2024-03-01 ENCOUNTER — OFFICE VISIT (OUTPATIENT)
Dept: OBSTETRICS AND GYNECOLOGY | Facility: CLINIC | Age: 39
End: 2024-03-01
Payer: COMMERCIAL

## 2024-03-01 VITALS
SYSTOLIC BLOOD PRESSURE: 138 MMHG | WEIGHT: 244.8 LBS | HEIGHT: 69 IN | BODY MASS INDEX: 36.26 KG/M2 | DIASTOLIC BLOOD PRESSURE: 82 MMHG

## 2024-03-01 DIAGNOSIS — N92.1 MENORRHAGIA WITH IRREGULAR CYCLE: ICD-10-CM

## 2024-03-01 DIAGNOSIS — Z00.00 ROUTINE MEDICAL EXAM: ICD-10-CM

## 2024-03-01 DIAGNOSIS — R53.83 OTHER FATIGUE: ICD-10-CM

## 2024-03-01 DIAGNOSIS — Z13.220 SCREENING, LIPID: ICD-10-CM

## 2024-03-01 DIAGNOSIS — Z30.431 IUD CHECK UP: Primary | ICD-10-CM

## 2024-03-01 LAB
ALBUMIN SERPL-MCNC: 4.2 G/DL (ref 3.5–5)
ALP BLD-CCNC: 101 U/L (ref 35–125)
ALT SERPL-CCNC: 14 U/L (ref 5–40)
ANION GAP SERPL CALC-SCNC: 12 MMOL/L
AST SERPL-CCNC: 13 U/L (ref 5–40)
BACTERIA #/AREA URNS AUTO: ABNORMAL /HPF
BILIRUB SERPL-MCNC: <0.2 MG/DL (ref 0.1–1.2)
BUN SERPL-MCNC: 6 MG/DL (ref 8–25)
CALCIUM SERPL-MCNC: 8.9 MG/DL (ref 8.5–10.4)
CHLORIDE SERPL-SCNC: 104 MMOL/L (ref 97–107)
CHOLEST SERPL-MCNC: 173 MG/DL (ref 133–200)
CHOLEST/HDLC SERPL: 3.5 {RATIO}
CO2 SERPL-SCNC: 24 MMOL/L (ref 24–31)
CREAT SERPL-MCNC: 0.7 MG/DL (ref 0.4–1.6)
EBV EA IGG SER QL: NEGATIVE
EBV NA AB SER QL: POSITIVE
EBV VCA IGG SER IA-ACNC: POSITIVE
EBV VCA IGM SER IA-ACNC: ABNORMAL
EGFRCR SERPLBLD CKD-EPI 2021: >90 ML/MIN/1.73M*2
ERYTHROCYTE [DISTWIDTH] IN BLOOD BY AUTOMATED COUNT: 14.1 % (ref 11.5–14.5)
GLUCOSE SERPL-MCNC: 77 MG/DL (ref 65–99)
HCT VFR BLD AUTO: 38.2 % (ref 36–46)
HDLC SERPL-MCNC: 49 MG/DL
HGB BLD-MCNC: 12.3 G/DL (ref 12–16)
LDLC SERPL CALC-MCNC: 108 MG/DL (ref 65–130)
MCH RBC QN AUTO: 28.9 PG (ref 26–34)
MCHC RBC AUTO-ENTMCNC: 32.2 G/DL (ref 32–36)
MCV RBC AUTO: 90 FL (ref 80–100)
NRBC BLD-RTO: 0 /100 WBCS (ref 0–0)
PLATELET # BLD AUTO: 417 X10*3/UL (ref 150–450)
POTASSIUM SERPL-SCNC: 4.2 MMOL/L (ref 3.4–5.1)
PROT SERPL-MCNC: 7.3 G/DL (ref 5.9–7.9)
RBC # BLD AUTO: 4.26 X10*6/UL (ref 4–5.2)
RBC #/AREA URNS AUTO: ABNORMAL /HPF
SODIUM SERPL-SCNC: 140 MMOL/L (ref 133–145)
SQUAMOUS #/AREA URNS AUTO: ABNORMAL /HPF
TRIGL SERPL-MCNC: 78 MG/DL (ref 40–150)
TSH SERPL DL<=0.05 MIU/L-ACNC: 1.68 MIU/L (ref 0.27–4.2)
WBC # BLD AUTO: 9.9 X10*3/UL (ref 4.4–11.3)
WBC #/AREA URNS AUTO: ABNORMAL /HPF

## 2024-03-01 PROCEDURE — 36415 COLL VENOUS BLD VENIPUNCTURE: CPT

## 2024-03-01 PROCEDURE — 86663 EPSTEIN-BARR ANTIBODY: CPT

## 2024-03-01 PROCEDURE — 99213 OFFICE O/P EST LOW 20 MIN: CPT | Performed by: OBSTETRICS & GYNECOLOGY

## 2024-03-01 PROCEDURE — 86665 EPSTEIN-BARR CAPSID VCA: CPT

## 2024-03-01 PROCEDURE — 81001 URINALYSIS AUTO W/SCOPE: CPT

## 2024-03-01 PROCEDURE — 80053 COMPREHEN METABOLIC PANEL: CPT

## 2024-03-01 PROCEDURE — 84443 ASSAY THYROID STIM HORMONE: CPT

## 2024-03-01 PROCEDURE — 80061 LIPID PANEL: CPT

## 2024-03-01 PROCEDURE — 85027 COMPLETE CBC AUTOMATED: CPT

## 2024-03-01 PROCEDURE — 86664 EPSTEIN-BARR NUCLEAR ANTIGEN: CPT

## 2024-03-01 ASSESSMENT — ENCOUNTER SYMPTOMS
OCCASIONAL FEELINGS OF UNSTEADINESS: 0
DEPRESSION: 0
LOSS OF SENSATION IN FEET: 0

## 2024-03-01 ASSESSMENT — PATIENT HEALTH QUESTIONNAIRE - PHQ9
SUM OF ALL RESPONSES TO PHQ9 QUESTIONS 1 AND 2: 0
2. FEELING DOWN, DEPRESSED OR HOPELESS: NOT AT ALL
1. LITTLE INTEREST OR PLEASURE IN DOING THINGS: NOT AT ALL

## 2024-03-01 ASSESSMENT — PAIN SCALES - GENERAL: PAINLEVEL: 0-NO PAIN

## 2024-03-01 NOTE — PROGRESS NOTES
GYN OFFICE VISIT    Patient Name:  Anna Ling  :  1985  MR #:  [id number]  Acct #:  [id number]      ASSESSMENT/PLAN:     1. IUD check up         Anna was seen today for follow-up.  Diagnoses and all orders for this visit:  IUD check up (Primary)  -     US PELVIS TRANSABDOMINAL WITH TRANSVAGINAL; Future  Menorrhagia with irregular cycle  -     US PELVIS TRANSABDOMINAL WITH TRANSVAGINAL; Future      Menorrhagia with irregular cycle  Anticipate that menorrahgia will improve with iud in place longer.  Will obtain pelvic USN to verify position.        .All questions answered.  Diagnosis explained in detail, including differential.  Discussed healthy lifestyle modifications.  Pelvic ultrasound.    Follow up for pending USN.      Subjective    Chief Complaint   Patient presents with    Follow-up       Anna Ling is a 38 y.o.  No LMP recorded.   female who presents for followup re IUD insertion, placed 3 weeks ago.  Pt reports continued bleeding, with passage of nickel slize clots.  Unhappy.      Past Medical History:   Diagnosis Date    Atypical squamous cells of undetermined significance on cytologic smear of cervix (ASC-US) 2013    Pap smear abnormality of cervix with ASCUS favoring benign    Chlamydial infection, unspecified     Disease due to chlamydiae    Controlled type 2 diabetes mellitus with hyperglycemia, without long-term current use of insulin (CMS/Formerly [facility])     Encounter for gynecological examination (general) (routine) without abnormal findings     Encounter for routine gynecological examination    Essential (primary) hypertension     Mixed hyperlipidemia     Other conditions influencing health status     Cervical Dysplasia    Other conditions influencing health status     Cervical Intraepithelial Glandular Neoplasia    Personal history of other diseases of the nervous system and sense organs     History of blurred vision       Past Surgical History:   Procedure Laterality  Date    CERVICAL BIOPSY  W/ LOOP ELECTRODE EXCISION  06/20/2013    Cervical Loop Electrosurgical Excision (LEEP)    COLPOSCOPY  06/20/2013    Colposcopy    OTHER SURGICAL HISTORY  06/20/2013    Colposcopy With Loop Electrode Excision Of The Cervix       Social History     Socioeconomic History    Marital status: Single     Spouse name: Not on file    Number of children: Not on file    Years of education: Not on file    Highest education level: Not on file   Occupational History    Not on file   Tobacco Use    Smoking status: Every Day     Types: Cigars    Smokeless tobacco: Never    Tobacco comments:     3 cigars   Vaping Use    Vaping Use: Never used   Substance and Sexual Activity    Alcohol use: Yes     Comment: rare    Drug use: Defer    Sexual activity: Yes     Birth control/protection: I.U.D.   Other Topics Concern    Not on file   Social History Narrative    Not on file     Social Determinants of Health     Financial Resource Strain: Not on file   Food Insecurity: Not on file   Transportation Needs: Not on file   Physical Activity: Not on file   Stress: Not on file   Social Connections: Not on file   Intimate Partner Violence: Not on file   Housing Stability: Not on file       No family history on file.    Prior to Admission medications    Medication Sig Start Date End Date Taking? Authorizing Provider   Mirena 21 mcg/24 hours (8 yrs) 52 mg IUD  1/9/24  Yes Historical Provider, MD   dulaglutide (Trulicity) 0.75 mg/0.5 mL pen injector Inject 0.75 mg under the skin 1 (one) time per week. 2/5/24   Sumit Kulkarni MD   dulaglutide (Trulicity) 1.5 mg/0.5 mL pen injector injection Inject 1.5 mg under the skin 1 (one) time per week. 2/5/24   Sumit Kulkarni MD   FreeStyle Bill sensor system (FreeStyle Bill 2 Sensor) kit -use every 14 days 1/10/24   Sumit Kulkarni MD   glimepiride (AmaryL) 4 mg tablet Take 1 tablet (4 mg) by mouth once daily in the morning. Take before meals. 1/10/24 1/9/25  Sumit Kulkarni MD  "  insulin glargine (Lantus Solostar U-100 Insulin) 100 unit/mL (3 mL) pen USE 50 UNITS SUBCUTANEOUS EVERY DAY 30 1/25/24   Sumit Kulkarni MD   LORazepam (Ativan) 0.5 mg tablet Take 1-2 tablets (0.5-1 mg) by mouth see administration instructions. Take 30 minutes prior to procedure. 2/5/24 3/6/24  Laura Curran MD   nicotine (Nicoderm CQ) 21 mg/24 hr patch Place 1 patch over 24 hours on the skin once every 24 hours. 1/18/24 2/17/24  Laura Curran MD   pen needle, diabetic 32 gauge x 5/32\" needle once every 24 hours. 2/3/21   Historical Provider, MD paytonde (Mounjaro) 2.5 mg/0.5 mL pen injector Inject 2.5 mg under the skin every 7 days. 1/10/24   Sumit Kulkarni MD   tirzepatide (Mounjaro) 5 mg/0.5 mL pen injector Inject 5 mg under the skin every 7 days. 1/10/24 1/9/25  Sumit Kulkarni MD   topiramate (Topamax) 25 mg tablet Take 2 tablets (50 mg) by mouth once daily at bedtime. 2/21/24 2/20/25  Odin Davila MD       No Known Allergies           OBJECTIVE:   /82   Ht 1.753 m (5' 9\")   Wt 111 kg (244 lb 12.8 oz)   BMI 36.15 kg/m²   Body mass index is 36.15 kg/m².     Physical Exam  Vitals and nursing note reviewed.   Constitutional:       General: She is not in acute distress.  Cardiovascular:      Rate and Rhythm: Normal rate and regular rhythm.      Heart sounds: No murmur heard.     No friction rub. No gallop.   Pulmonary:      Effort: Pulmonary effort is normal.      Breath sounds: Normal breath sounds. No wheezing or rales.   Abdominal:      General: Bowel sounds are normal. There is no distension.      Palpations: Abdomen is soft. There is no mass.      Tenderness: There is no abdominal tenderness. There is no guarding or rebound.      Hernia: No hernia is present.   Genitourinary:     General: Normal vulva.      Labia:         Right: No rash or lesion.         Left: No rash or lesion.       Urethra: No urethral pain or urethral swelling.      Vagina: No erythema, tenderness, " bleeding or lesions.      Cervix: No cervical motion tenderness, discharge, friability, lesion, erythema, cervical bleeding or eversion.      Uterus: Normal. Not enlarged and not tender.       Adnexa:         Right: No mass, tenderness or fullness.          Left: No mass, tenderness or fullness.        Comments: IUD strings visible, appropriate length  Neurological:      Mental Status: She is alert.   Psychiatric:         Mood and Affect: Mood normal.         Behavior: Behavior normal.         Thought Content: Thought content normal.         Judgment: Judgment normal.             Note: This dictation was generated using Dragon voice recognition software. Please excuse any grammatical or spelling errors that may have occurred using the system.

## 2024-03-03 PROBLEM — N92.1 MENORRHAGIA WITH IRREGULAR CYCLE: Status: ACTIVE | Noted: 2024-03-03

## 2024-03-03 PROBLEM — Z30.431 IUD CHECK UP: Status: ACTIVE | Noted: 2024-03-03

## 2024-03-03 LAB — EBV VCA IGM SER-ACNC: <10 U/ML (ref 0–43.9)

## 2024-03-04 NOTE — ASSESSMENT & PLAN NOTE
Anticipate that menorrahgia will improve with iud in place longer.  Will obtain pelvic USN to verify position.

## 2024-03-11 NOTE — PROGRESS NOTES
"[facility] Sleep Medicine Clinic  New Visit Note        HISTORY OF PRESENT ILLNESS     The patient's referring provider is: Arnoldo Rasmussen MD    HISTORY OF PRESENT ILLNESS   Anna Ling is a 38 y.o. female who presents to a [facility] Sleep Medicine Clinic for a sleep medicine evaluation with concerns of Snoring, Unrefreshing Sleep, Excessive Daytime Sleepiness, and sleep talking.     PAST SLEEP HISTORY    Patient has the following sleep-related diagnoses and sleep study results: none    CURRENT HISTORY    On today's visit, the patient reports she is sleepy during the day. She has felt this way a long time.     She notes she has migraines and is followed by neurology. Taking topamax. She often wakes up with headaches. Headaches are almost daily.    She denies any family history of sleep apnea.    STOP  3  BANG 1    SLEEP ROS:  Naps:   twice weekly    Average sleep duration ~7 hours/day    Preferred sleeping position: side, stomach    Sleep-related ROS:    Sleep Initiation: no problems going to sleep    Sleep Maintenance: denies    Breathing during sleep: snoring and witnessed apneas    Sleep-related behaviors:  Sleep talking    Recreational drug use  Smoking: cigars - use is down  Alcohol consumption: occasional  Caffeine consumption:  1/day  Marijuana: past use    ESS: 15  TESSY: 10      PHYSICAL EXAM     VITAL SIGNS: /70   Pulse 73   Ht 1.753 m (5' 9\")   Wt 110 kg (242 lb)   SpO2 98%   BMI 35.74 kg/m²      CURRENT WEIGHT: [unfilled]  BMI: [unfilled]  PREVIOUS WEIGHTS:  Wt Readings from Last 3 Encounters:   03/12/24 110 kg (242 lb)   03/01/24 111 kg (244 lb 12.8 oz)   02/21/24 113 kg (249 lb)       PHYSICAL EXAM: GENERAL: alert oriented x 3 pleasant and cooperative no acute distress  MODIFIED PENN[place] SCORE: 3+  MODIFIED MALLAMPATI SCORE: 3+  LATERAL PHARYNGEAL WALL: 3+    NECK EXAM: normal supple no adenopathy    RESULTS/DATA     Iron (UG/DL)   Date Value   09/21/2018 62     Iron " Saturation (%)   Date Value   09/21/2018 18.4     TIBC (UG/DL)   Date Value   09/21/2018 337       ASSESSMENT/PLAN     Ms. Ling is a 38 y.o. female and She was referred to the [facility] Sleep Medicine Clinic for the following issues:    OBSTRUCTIVE SLEEP APNEA, SUSPECTED / SLEEPINESS / HAS / SNORING  -Ordering sleep study  -Discussed symptoms and risks of untreated sleep apnea    BMI>35  -Body mass index is 35.74 kg/m².  today  -With sufficient weight loss may no longer require treatment for GAIL    Followup 3 weeks after sleep study to review results        no

## 2024-03-12 ENCOUNTER — OFFICE VISIT (OUTPATIENT)
Dept: SLEEP MEDICINE | Facility: CLINIC | Age: 39
End: 2024-03-12
Payer: COMMERCIAL

## 2024-03-12 VITALS
BODY MASS INDEX: 35.84 KG/M2 | SYSTOLIC BLOOD PRESSURE: 132 MMHG | HEART RATE: 73 BPM | WEIGHT: 242 LBS | DIASTOLIC BLOOD PRESSURE: 70 MMHG | OXYGEN SATURATION: 98 % | HEIGHT: 69 IN

## 2024-03-12 DIAGNOSIS — G47.19 EXCESSIVE DAYTIME SLEEPINESS: ICD-10-CM

## 2024-03-12 DIAGNOSIS — G43.E09 CHRONIC MIGRAINE WITH AURA WITHOUT STATUS MIGRAINOSUS, NOT INTRACTABLE: ICD-10-CM

## 2024-03-12 DIAGNOSIS — G47.30 SLEEP-RELATED BREATHING DISORDER: Primary | ICD-10-CM

## 2024-03-12 DIAGNOSIS — R06.83 SNORING: ICD-10-CM

## 2024-03-12 DIAGNOSIS — Z72.0 TOBACCO ABUSE: ICD-10-CM

## 2024-03-12 DIAGNOSIS — G43.E19 INTRACTABLE CHRONIC MIGRAINE WITH AURA AND WITHOUT STATUS MIGRAINOSUS: ICD-10-CM

## 2024-03-12 PROCEDURE — 99203 OFFICE O/P NEW LOW 30 MIN: CPT | Performed by: PHYSICIAN ASSISTANT

## 2024-03-12 ASSESSMENT — PAIN SCALES - GENERAL: PAINLEVEL: 0-NO PAIN

## 2024-03-12 NOTE — PATIENT INSTRUCTIONS
Thank you for coming to the Sleep Medicine Clinic today! Your sleep medicine provider today was: Tarun Brar PA-C Below is a summary of your treatment plan, other important information, and our contact numbers:      TREATMENT PLAN     Call [phone]), option 3 to schedule your sleep study. When you have an appointment please call us back at [phone] to schedule a followup appointment 3-4 weeks after to review results.    Obstructive Sleep Apnea (GAIL) is a sleep disorder where your upper airway muscles relax during sleep and the airway intermittently and repetitively narrows and collapses leading to partially blocked airway (hypopnea) or completely blocked airway (apnea) which, in turn, can disrupt breathing in sleep, lower oxygen levels while you sleep and cause night time wakings. Because both apnea and hypopnea may cause higher carbon dioxide or low oxygen levels, untreated GAIL can lead to heart arrhythmia, elevation of blood pressure, and make it harder for the body to consolidate memory and facilitate metabolism (leading to higher blood sugars at night). Frequent partial arousals occur during sleep resulting in sleep deprivation and daytime sleepiness. GAIL is associated with an increased risk of cardiovascular disease, stroke, hypertension, and insulin resistance. Moreover, untreated GAIL with excessive daytime sleepiness can increase the risk of motor vehicular accidents.    Some conservative strategies for GAIL regardless of GAIL severity are:   Positional therapy - Avoid sleeping on your back.   Healthy diet and regular exercise to optimize weight is highly encouraged.   Avoid alcohol late in the evening and sedative-hypnotics as these substances can make sleep apnea worse.   Improve breathing through the nose with intranasal steroid spray, saline rinse, or antihistamines    Safety: Avoid driving vehicle and operating heavy equipment while sleepy. Drowsy driving may lead to life-threatening  motor vehicle accidents. A person driving while sleepy is 5 times more likely to have an accident. If you feel sleepy, pull over and take a short power nap (sleep for less than 30 minutes). Otherwise, ask somebody to drive you.    Treatment options for sleep apnea include weight management, positional therapy, Positive Airway Therapy (PAP) therapy, oral appliance therapy, hypoglossal nerve stimulator (Inspire) and select airway surgeries.      OUR SLEEP TESTING LOCATIONS     Our team will contact you to schedule your sleep study, however, you can contact us as follow:  Main Phone Line (scheduling only): [phone]), option 3  Adult and Pediatric Locations  [facility] (6 years and older): [facility] - 4th floor ([address]) After hours line: [phone]  [facility] (Main campus: All ages): [facility], 6th floor. After hours line: [phone]   [place] (18 years and older): [address] [facility], 2nd floor   [place] (18 years and older): [address] [facility]; 4th floor  After hours line: [phone]  [facility] (18 years and older) at [place]: [address] [facility] [facility]  After hours line: [phone]    [place] (5 years and older; younger considered on case-by-case basis): [address]; [address], [address]. Scheduling  After hours line: [phone]   [place] (6 years and older): [address] ; [address]; [address]   [place] (6 years and older): [address], [address]  After hours line: [phone]   [place] (13 years and older) in [place]: [address], 2nd floor  After hours line: [phone]   [place] (13 year and older): [address]  After hours line: [phone]      IMPORTANT PHONE NUMBERS     Sleep Medicine Clinic Fax: [phone]  Appointments (for Adult Sleep Clinic): [phone] - option 2  Appointments (For Sleep Studies):  [phone] - option 3  Behavioral Sleep Medicine: [phone]    [facility] (Y[place]): [phone]  For clinical questions and refilling prescriptions: [phone]  Amy Jones (For [place]): P: [phone]  F: [phone]       CONTACTING YOUR SLEEP MEDICINE PROVIDER     Send a message directly to your provider through \"My Chart\", which is the email service through your  Records Account: [email or web address]   Call [phone] and leave a message. One of the administrative assistants will forward the message to your sleep medicine provider through \"My Chart\" and/or email.     Your sleep medicine provider for this visit was: Tarun Brar PA-C    "

## 2024-03-13 DIAGNOSIS — E11.69 TYPE 2 DIABETES MELLITUS WITH OTHER SPECIFIED COMPLICATION, UNSPECIFIED WHETHER LONG TERM INSULIN USE (MULTI): Primary | ICD-10-CM

## 2024-03-27 DIAGNOSIS — E11.69 TYPE 2 DIABETES MELLITUS WITH OTHER SPECIFIED COMPLICATION, UNSPECIFIED WHETHER LONG TERM INSULIN USE (MULTI): ICD-10-CM

## 2024-03-27 RX ORDER — DULAGLUTIDE 0.75 MG/.5ML
0.75 INJECTION, SOLUTION SUBCUTANEOUS
Qty: 6 ML | Refills: 1 | Status: SHIPPED | OUTPATIENT
Start: 2024-03-27 | End: 2024-04-11 | Stop reason: ALTCHOICE

## 2024-04-11 ENCOUNTER — CLINICAL SUPPORT (OUTPATIENT)
Dept: ENDOCRINOLOGY | Facility: CLINIC | Age: 39
End: 2024-04-11
Payer: COMMERCIAL

## 2024-04-11 VITALS
WEIGHT: 242.2 LBS | HEART RATE: 75 BPM | HEIGHT: 69 IN | DIASTOLIC BLOOD PRESSURE: 84 MMHG | BODY MASS INDEX: 35.87 KG/M2 | SYSTOLIC BLOOD PRESSURE: 125 MMHG

## 2024-04-11 DIAGNOSIS — I10 PRIMARY HYPERTENSION: ICD-10-CM

## 2024-04-11 DIAGNOSIS — E11.69 TYPE 2 DIABETES MELLITUS WITH OTHER SPECIFIED COMPLICATION, UNSPECIFIED WHETHER LONG TERM INSULIN USE (MULTI): Primary | ICD-10-CM

## 2024-04-11 DIAGNOSIS — E78.2 MIXED HYPERLIPIDEMIA: ICD-10-CM

## 2024-04-11 LAB — POC HEMOGLOBIN A1C: 6.8 % (ref 4.2–6.5)

## 2024-04-11 PROCEDURE — 99213 OFFICE O/P EST LOW 20 MIN: CPT | Performed by: INTERNAL MEDICINE

## 2024-04-11 PROCEDURE — 83036 HEMOGLOBIN GLYCOSYLATED A1C: CPT | Performed by: INTERNAL MEDICINE

## 2024-04-11 RX ORDER — BLOOD-GLUCOSE SENSOR
EACH MISCELLANEOUS
Qty: 6 EACH | Refills: 3 | Status: SHIPPED | OUTPATIENT
Start: 2024-04-11

## 2024-04-11 RX ORDER — DULAGLUTIDE 4.5 MG/.5ML
4.5 INJECTION, SOLUTION SUBCUTANEOUS
Qty: 2 ML | Refills: 5 | Status: SHIPPED | OUTPATIENT
Start: 2024-04-14 | End: 2024-05-02 | Stop reason: SDUPTHER

## 2024-04-11 NOTE — PATIENT INSTRUCTIONS
Your A1c was 6.8% today - this is great!!!     Continue glimepiride 4mg daily.     INCREASE your Trulicity to 4.5mg weekly.   - A new prescription has been sent to [email or web address].     DECREASE your Lantus dose by 5 units every 3 days if your morning sugars are consistently under 100.   - Take just 35 units daily for the next 3 days - if your morning sugars are still under 100, then decrease to 30 units daily and so on.     Switch to the Bill 3 rigoberto and sensor when you are due to change your sensor.  - A new prescription has been sent to [email or web address] for this.     Follow up with Dr. Kulkarni in 4 months and call if you need anything sooner.

## 2024-04-11 NOTE — PROGRESS NOTES
Subjective   Patient ID: Anna Ling is a 38 y.o. female who presents for Diabetes.  HPI  37yo pmh dm2 (dx 2020), dyslipidemia, depression/anxiety, headaches presents in follow up.  Previously lost to follow up, last seen in January.     Last A1c 10.1% - today 6.8%.      Checking sugars 2+ times a day with Bill 2, using rigoberto.   No recent blood sugar tests.  No low sugars. Pt is doing better with carb control in the diet. Pt is not all that active.         Taking Trulicity 3mg weekly (doses were getting mixed up at the pharmacy - now taking 3mg), Lantus 40 units daily (down from 70 units), and glimepiride 4mg daily.   - Stopped metformin last visit - unable to tolerate.     - Previously no glp-1/Sgt2-i as pt wasn't using any contraception - now has IUD.            Was taking atorvastatin 40mg daily for lipids and tolerating, not currently taking.     Was taking diazide and amlodipine for htn, currently not taking.  - Pt reports taking one pill for her blood pressure but cannot recall the name and could not find it on her pharmacy rigoberto; will bring bottle next visit.            Mood is stable, not seeing a counselor currently.          Pt sexually active, not planning pregnancy. Pt had taken ocp in the past, now has IUD in place.            Seeing neurology for headaches - last visit noted pt was given rx for taper of steroids, but had not started.   - now on topiramate for migraines; not working very well per patient.     Current Outpatient Medications:     FreeStyle Bill 3 Sensor device, Check sugars daily. Apply new sensor every 14 days., Disp: 6 each, Rfl: 3    glimepiride (AmaryL) 4 mg tablet, Take 1 tablet (4 mg) by mouth once daily in the morning. Take before meals., Disp: 90 tablet, Rfl: 3    insulin glargine (Lantus Solostar U-100 Insulin) 100 unit/mL (3 mL) pen, USE 50 UNITS SUBCUTANEOUS EVERY DAY 30, Disp: 25 mL, Rfl: 3    LORazepam (Ativan) 0.5 mg tablet, Take 1-2 tablets (0.5-1 mg) by mouth see  "administration instructions. Take 30 minutes prior to procedure., Disp: 2 tablet, Rfl: 0    Mirena 21 mcg/24 hours (8 yrs) 52 mg IUD, , Disp: , Rfl:     nicotine (Nicoderm CQ) 21 mg/24 hr patch, Place 1 patch over 24 hours on the skin once every 24 hours., Disp: 30 patch, Rfl: 0    pen needle, diabetic 32 gauge x 5/32\" needle, once every 24 hours., Disp: , Rfl:     topiramate (Topamax) 25 mg tablet, Take 2 tablets (50 mg) by mouth once daily at bedtime., Disp: 30 tablet, Rfl: 2    [START ON 4/14/2024] Trulicity 4.5 mg/0.5 mL pen injector, Inject 4.5 mg under the skin 1 (one) time per week., Disp: 2 mL, Rfl: 5   No Known Allergies    Review of Systems  See HPI.     Objective   /84   Pulse 75   Ht 1.753 m (5' 9\")   Wt 110 kg (242 lb 3.2 oz)   BMI 35.77 kg/m²     Labs:   Lab Results   Component Value Date    HGBA1C 6.8 (A) 04/11/2024     Lab Results   Component Value Date    CALCIUM 8.9 03/01/2024    AST 13 03/01/2024    ALKPHOS 101 03/01/2024    BILITOT <0.2 03/01/2024    PROT 7.3 03/01/2024    ALBUMIN 4.2 03/01/2024     03/01/2024    K 4.2 03/01/2024     03/01/2024    CO2 24 03/01/2024    ANIONGAP 12 03/01/2024    BUN 6 (L) 03/01/2024    CREATININE 0.70 03/01/2024    UREACREAUR 11.7 04/23/2021    GLUCOSE 77 03/01/2024    ALT 14 03/01/2024    EGFR >90 03/01/2024     Lab Results   Component Value Date    WBC 9.9 03/01/2024    NRBC 0.0 03/01/2024    RBC 4.26 03/01/2024    HGB 12.3 03/01/2024    HCT 38.2 03/01/2024     03/01/2024     Lab Results   Component Value Date    CHOL 173 03/01/2024    TRIG 78 03/01/2024    HDL 49.0 (L) 03/01/2024    LDLCALC 108 03/01/2024     Lab Results   Component Value Date    TSH 1.68 03/01/2024     Lab Results   Component Value Date    KQZUEFZU13 300 04/23/2021     Lab Results   Component Value Date    VITD25 25 (L) 04/23/2021       Assessment    1. Type 2 diabetes mellitus with other specified complication, unspecified whether long term insulin use (CMS/[facility])  "   2. Primary hypertension    3. Mixed hyperlipidemia        Medical Decision Making  Complexity of problem: Chronic illness of diabetes mellitus uncontrolled, progressing  Data analyzed and reviewed: Reviewed prior notes, blood glucose data, labs including HgbA1c, lipids, serum chemistries.  Ordered tests.   Risk of complications and morbidities: Is definite because of use of insulin and risk of hypoglycemia.  Prescription medications reviewed and modifications made.  Compliance assessed.  Addressed social determinants of health including food insecurity.    Plan   1. Type 2 diabetes mellitus with other specified complication, unspecified whether long term insulin use (Multi)  - FreeStyle Bill 3 Sensor device; Check sugars daily. Apply new sensor every 14 days.  Dispense: 6 each; Refill: 3  - POCT glycosylated hemoglobin (Hb A1C) manually resulted    -A1c ordered and reviewed.   -CGM data downloaded and reviewed.   -Labs reviewed.     -Great improvement with A1c and time in target since last visit.   -Will increase Trulicity to 4.5mg weekly and decrease Lantus to 35 units daily and continue decreasing by 5u q 3 days for am sugars under 100.        2. Primary hypertension  -BP at target today.   -Continue current therapy.     3. Mixed hyperlipidemia  -On statin and tolerating.   -Continue current therapy.        -labs/tests/notes reviewed  -reviewed and counseled patient on medication monitoring and side effects    Follow Up: 4 months BB    Treatment and plan discussed with Dr. Sumit Kulkarni.   LIN Lebron, PharmD, BCACP, CDCES.

## 2024-04-16 ENCOUNTER — TELEPHONE (OUTPATIENT)
Dept: ENDOCRINOLOGY | Facility: CLINIC | Age: 39
End: 2024-04-16
Payer: COMMERCIAL

## 2024-04-16 NOTE — TELEPHONE ENCOUNTER
"Patient hit the wall with her sensor in her arm and the sensor came off arm. Patient believes the \"needle\" is still in her arm.     Patient was able to verbally tell me that she see's the black plastic filament on the sensor which is out of her arm and still attached to the sensor.     After speaking with Analia, Pharm D, she advised me that as long as the black filament is on the sensor, then there is nothing in patients arm, but if she is still worried, she can call [employer] Customer Care Team at [phone] and see what they advise her to do.     Patient given information above.     Abril    "

## 2024-04-18 ENCOUNTER — TELEPHONE (OUTPATIENT)
Dept: ENDOCRINOLOGY | Facility: CLINIC | Age: 39
End: 2024-04-18
Payer: COMMERCIAL

## 2024-04-18 NOTE — TELEPHONE ENCOUNTER
Received incoming fax stating Trulicity 4.5 is on back order.      Per BB, have patient call [facility] to see if they have this medication in stock.     Will await patients return call.     Abril

## 2024-04-30 ENCOUNTER — TELEPHONE (OUTPATIENT)
Dept: PRIMARY CARE | Facility: CLINIC | Age: 39
End: 2024-04-30
Payer: COMMERCIAL

## 2024-04-30 NOTE — TELEPHONE ENCOUNTER
Pt lvm stating she had her feet and nails done some weeks ago and now she thinks she may have a fungus. Pt is wondering if something can be sent in for her or does she need to come in to be seen

## 2024-05-01 ENCOUNTER — OFFICE VISIT (OUTPATIENT)
Dept: PRIMARY CARE | Facility: CLINIC | Age: 39
End: 2024-05-01
Payer: COMMERCIAL

## 2024-05-01 VITALS
BODY MASS INDEX: 35.55 KG/M2 | HEART RATE: 87 BPM | WEIGHT: 240 LBS | SYSTOLIC BLOOD PRESSURE: 124 MMHG | RESPIRATION RATE: 18 BRPM | TEMPERATURE: 97.8 F | HEIGHT: 69 IN | DIASTOLIC BLOOD PRESSURE: 70 MMHG | OXYGEN SATURATION: 98 %

## 2024-05-01 DIAGNOSIS — L03.031 ACUTE PARONYCHIA OF TOE OF RIGHT FOOT: Primary | ICD-10-CM

## 2024-05-01 PROBLEM — E01.0 THYROMEGALY: Status: ACTIVE | Noted: 2024-05-01

## 2024-05-01 PROBLEM — E55.9 VITAMIN D DEFICIENCY: Status: ACTIVE | Noted: 2024-05-01

## 2024-05-01 PROBLEM — E78.2 MIXED HYPERLIPIDEMIA: Status: ACTIVE | Noted: 2024-05-01

## 2024-05-01 PROBLEM — I10 PRIMARY HYPERTENSION: Status: ACTIVE | Noted: 2024-05-01

## 2024-05-01 PROBLEM — E07.9 DISORDER OF THYROID: Status: ACTIVE | Noted: 2024-05-01

## 2024-05-01 PROBLEM — E11.65 TYPE 2 DIABETES MELLITUS WITH HYPERGLYCEMIA (MULTI): Status: ACTIVE | Noted: 2024-05-01

## 2024-05-01 PROCEDURE — 3074F SYST BP LT 130 MM HG: CPT | Performed by: NURSE PRACTITIONER

## 2024-05-01 PROCEDURE — 3049F LDL-C 100-129 MG/DL: CPT | Performed by: NURSE PRACTITIONER

## 2024-05-01 PROCEDURE — 99214 OFFICE O/P EST MOD 30 MIN: CPT | Performed by: NURSE PRACTITIONER

## 2024-05-01 PROCEDURE — 3078F DIAST BP <80 MM HG: CPT | Performed by: NURSE PRACTITIONER

## 2024-05-01 RX ORDER — SULFAMETHOXAZOLE AND TRIMETHOPRIM 800; 160 MG/1; MG/1
1 TABLET ORAL 2 TIMES DAILY
Qty: 10 TABLET | Refills: 0 | Status: SHIPPED | OUTPATIENT
Start: 2024-05-01 | End: 2024-05-06

## 2024-05-01 ASSESSMENT — PATIENT HEALTH QUESTIONNAIRE - PHQ9
SUM OF ALL RESPONSES TO PHQ9 QUESTIONS 1 AND 2: 0
1. LITTLE INTEREST OR PLEASURE IN DOING THINGS: NOT AT ALL
2. FEELING DOWN, DEPRESSED OR HOPELESS: NOT AT ALL

## 2024-05-01 ASSESSMENT — ENCOUNTER SYMPTOMS
CARDIOVASCULAR NEGATIVE: 1
OCCASIONAL FEELINGS OF UNSTEADINESS: 0
CONSTITUTIONAL NEGATIVE: 1
LOSS OF SENSATION IN FEET: 0
DEPRESSION: 0
RESPIRATORY NEGATIVE: 1

## 2024-05-01 ASSESSMENT — PAIN SCALES - GENERAL: PAINLEVEL: 8

## 2024-05-01 NOTE — PROGRESS NOTES
"Chief Complaint  Anna Ling is a 38 y.o. female presenting for \"Toe Pain (Dr Lee pt- here for toe fungus, pain, nail has some discoloration - got nails done a month ago but pain just started ).\"    HPI      Anna Ling is a 38 y.o. female presenting for toenail infection she had a pedicure in April and last week she noticed the pain in her toenail, took polish off.  Noticed redness and bruisiing. She is diabetic, her has Bill sensor, sugars run good.        Past Medical History  Patient Active Problem List    Diagnosis Date Noted    Vitamin D deficiency 05/01/2024    Type 2 diabetes mellitus with hyperglycemia (Multi) 05/01/2024    Thyromegaly 05/01/2024    Disorder of thyroid 05/01/2024    Primary hypertension 05/01/2024    Mixed hyperlipidemia 05/01/2024    IUD check up 03/03/2024    Menorrhagia with irregular cycle 03/03/2024        Medications  Current Outpatient Medications   Medication Instructions    FreeStyle Bill 3 Sensor device Check sugars daily. Apply new sensor every 14 days.    glimepiride (AMARYL) 4 mg, oral, Daily before breakfast    insulin glargine (Lantus Solostar U-100 Insulin) 100 unit/mL (3 mL) pen USE 50 UNITS SUBCUTANEOUS EVERY DAY 30    Mirena 21 mcg/24 hours (8 yrs) 52 mg IUD     pen needle, diabetic 32 gauge x 5/32\" needle Every 24 hours    sulfamethoxazole-trimethoprim (Bactrim DS) 800-160 mg tablet 1 tablet, oral, 2 times daily    topiramate (TOPAMAX) 50 mg, oral, Nightly    Trulicity 4.5 mg, subcutaneous, Once Weekly        Surgical History  She has a past surgical history that includes Other surgical history (06/20/2013); Cervical biopsy w/ loop electrode excision (06/20/2013); and Colposcopy (06/20/2013).     Social History  She reports that she has been smoking cigars. She has been exposed to tobacco smoke. She has never used smokeless tobacco. She reports current alcohol use. Drug use questions deferred to the physician.    Family History  No family history on " file.     Allergies  Patient has no known allergies.    ROS  Review of Systems   Constitutional: Negative.    Respiratory: Negative.     Cardiovascular: Negative.    Skin:         Toenail erythema and ecchymosis         Last Recorded Vitals  /70 (BP Location: Right arm, Patient Position: Sitting, BP Cuff Size: Adult)   Pulse 87   Temp 36.6 °C (97.8 °F)   Resp 18   Wt 109 kg (240 lb)   SpO2 98%     Physical Exam  Vitals and nursing note reviewed.   HENT:      Head: Normocephalic.   Cardiovascular:      Rate and Rhythm: Normal rate and regular rhythm.      Pulses: Normal pulses.      Heart sounds: Normal heart sounds.   Pulmonary:      Effort: Pulmonary effort is normal.      Breath sounds: Normal breath sounds.   Skin:     Comments: Erythema and ecchymosis of great toenail of right foot          Relevant Results      Assessment/Plan   Anna was seen today for toe pain.  Diagnoses and all orders for this visit:  Acute paronychia of toe of right foot (Primary)  -     sulfamethoxazole-trimethoprim (Bactrim DS) 800-160 mg tablet; Take 1 tablet by mouth 2 times a day for 5 days.          COUNSELING      Medication education:              Education:  The patient is counseled regarding potential side-effects of any and all new medications             Understanding:  Patient expressed understanding             Adherence:  No barriers to adherence identified        Mayra Putnam, APRN-CNP

## 2024-05-02 DIAGNOSIS — E11.69 TYPE 2 DIABETES MELLITUS WITH OTHER SPECIFIED COMPLICATION, UNSPECIFIED WHETHER LONG TERM INSULIN USE (MULTI): ICD-10-CM

## 2024-05-02 RX ORDER — DULAGLUTIDE 4.5 MG/.5ML
4.5 INJECTION, SOLUTION SUBCUTANEOUS
Qty: 2 ML | Refills: 5 | Status: SHIPPED | OUTPATIENT
Start: 2024-05-05

## 2024-06-04 ENCOUNTER — TELEPHONE (OUTPATIENT)
Dept: ENDOCRINOLOGY | Facility: CLINIC | Age: 39
End: 2024-06-04
Payer: COMMERCIAL

## 2024-06-04 NOTE — TELEPHONE ENCOUNTER
Anna Ling   1985   [id number]   [phone]       Pt called and mentioned that Trulicity 4.5mg is on backorder and she have tried the  Pharmacies too. Aslo she mentioned that her Bill 3 keep coming off her arm and is it ok to try another spot. Message sent to provider.

## 2024-06-12 DIAGNOSIS — E11.69 TYPE 2 DIABETES MELLITUS WITH OTHER SPECIFIED COMPLICATION, UNSPECIFIED WHETHER LONG TERM INSULIN USE (MULTI): ICD-10-CM

## 2024-06-12 RX ORDER — DULAGLUTIDE 4.5 MG/.5ML
4.5 INJECTION, SOLUTION SUBCUTANEOUS
Qty: 2 ML | Refills: 5 | Status: SHIPPED | OUTPATIENT
Start: 2024-06-16

## 2024-07-10 ENCOUNTER — TELEPHONE (OUTPATIENT)
Dept: ENDOCRINOLOGY | Facility: CLINIC | Age: 39
End: 2024-07-10
Payer: COMMERCIAL

## 2024-07-10 ENCOUNTER — OFFICE VISIT (OUTPATIENT)
Dept: OBSTETRICS AND GYNECOLOGY | Facility: CLINIC | Age: 39
End: 2024-07-10
Payer: COMMERCIAL

## 2024-07-10 VITALS
DIASTOLIC BLOOD PRESSURE: 92 MMHG | SYSTOLIC BLOOD PRESSURE: 121 MMHG | BODY MASS INDEX: 36.68 KG/M2 | WEIGHT: 242 LBS | HEIGHT: 68 IN

## 2024-07-10 DIAGNOSIS — R10.2 PELVIC PAIN: Primary | ICD-10-CM

## 2024-07-10 PROCEDURE — 99213 OFFICE O/P EST LOW 20 MIN: CPT | Performed by: OBSTETRICS & GYNECOLOGY

## 2024-07-10 PROCEDURE — 3080F DIAST BP >= 90 MM HG: CPT | Performed by: OBSTETRICS & GYNECOLOGY

## 2024-07-10 PROCEDURE — 3074F SYST BP LT 130 MM HG: CPT | Performed by: OBSTETRICS & GYNECOLOGY

## 2024-07-10 PROCEDURE — 3049F LDL-C 100-129 MG/DL: CPT | Performed by: OBSTETRICS & GYNECOLOGY

## 2024-07-10 ASSESSMENT — ENCOUNTER SYMPTOMS
DEPRESSION: 0
OCCASIONAL FEELINGS OF UNSTEADINESS: 0
LOSS OF SENSATION IN FEET: 0

## 2024-07-10 ASSESSMENT — PATIENT HEALTH QUESTIONNAIRE - PHQ9
2. FEELING DOWN, DEPRESSED OR HOPELESS: NOT AT ALL
SUM OF ALL RESPONSES TO PHQ9 QUESTIONS 1 AND 2: 0
1. LITTLE INTEREST OR PLEASURE IN DOING THINGS: NOT AT ALL

## 2024-07-10 ASSESSMENT — PAIN SCALES - GENERAL: PAINLEVEL: 8

## 2024-07-10 NOTE — TELEPHONE ENCOUNTER
Anna Ling   1985   [id number]   [phone]       Called and spoke to patient in regards to moving 8/15/24 appt to 8/9/24.

## 2024-07-10 NOTE — PROGRESS NOTES
Anna Ling is a 38 y.o. female,  who presented with  pelvic pain    She feels pinching sensation since the insertion of IUD   Partner feels string   Crampy   Aub , double periods            Obstetrical History:  OB History          2    Para   2    Term                AB        Living   3         SAB        IAB        Ectopic        Multiple   1    Live Births   3                    Gynecological history:  Menarche: 13   years old   Periods Irregular menses            Contraceptive history:  Contraception:    Currently using IUD           Vaginal discharge    No   Menopausal symptoms No       Past Medical History:   Diagnosis Date    Atypical squamous cells of undetermined significance on cytologic smear of cervix (ASC-US) 2013    Pap smear abnormality of cervix with ASCUS favoring benign    Chlamydial infection, unspecified     Disease due to chlamydiae    Controlled type 2 diabetes mellitus with hyperglycemia, without long-term current use of insulin (Multi)     Encounter for gynecological examination (general) (routine) without abnormal findings     Encounter for routine gynecological examination    Essential (primary) hypertension     Mixed hyperlipidemia     Other conditions influencing health status     Cervical Dysplasia    Other conditions influencing health status     Cervical Intraepithelial Glandular Neoplasia    Personal history of other diseases of the nervous system and sense organs     History of blurred vision     Past Surgical History:   Procedure Laterality Date    CERVICAL BIOPSY  W/ LOOP ELECTRODE EXCISION  2013    Cervical Loop Electrosurgical Excision (LEEP)    COLPOSCOPY  2013    Colposcopy    OTHER SURGICAL HISTORY  2013    Colposcopy With Loop Electrode Excision Of The Cervix     No family history on file.  Social History     Tobacco Use    Smoking status: Every Day     Types: Cigars     Passive exposure: Current    Smokeless tobacco: Never  "   Tobacco comments:     3 cigars   Vaping Use    Vaping status: Never Used   Substance Use Topics    Alcohol use: Yes     Comment: rare    Drug use: Defer     Social History     Tobacco Use   Smoking Status Every Day    Types: Cigars    Passive exposure: Current   Smokeless Tobacco Never   Tobacco Comments    3 cigars       Current Outpatient Medications on File Prior to Visit   Medication Sig Dispense Refill    FreeStyle Bill 3 Sensor device Check sugars daily. Apply new sensor every 14 days. 6 each 3    glimepiride (AmaryL) 4 mg tablet Take 1 tablet (4 mg) by mouth once daily in the morning. Take before meals. 90 tablet 3    insulin glargine (Lantus Solostar U-100 Insulin) 100 unit/mL (3 mL) pen USE 50 UNITS SUBCUTANEOUS EVERY DAY 30 25 mL 3    Mirena 21 mcg/24 hours (8 yrs) 52 mg IUD       pen needle, diabetic 32 gauge x 5/32\" needle once every 24 hours.      topiramate (Topamax) 25 mg tablet Take 2 tablets (50 mg) by mouth once daily at bedtime. 30 tablet 2    Trulicity 4.5 mg/0.5 mL pen injector Inject 4.5 mg under the skin 1 (one) time per week. 2 mL 5     No current facility-administered medications on file prior to visit.     No Known Allergies      REVIEW OF SYSTEMS:    General: No weight loss, malaise or fevers or other constitutional symptoms.  Neuro: No history of TIA's, stroke,.  No neurological symptoms or problems. No visual changes  Respiratory: No history of respiratory/pulmonary symptoms or problems.  Cardiovascular: No history of cardiovascular symptoms or problems.  GI: No history of GI symptoms or problems.   : No history of UTI in past 6 weeks.  No history of  symptoms or problems.  BREASTS: No skin dimpling, nipple discharge or masses.  Endocrine: No history of diabetes. No Thyroid symptoms  Hematology: No history of bleeding or clotting disorder.  Skin: Negative for lesions, rash, and itching.      PHYSICAL EXAMINATION:    BP (!) 121/92   Ht 1.727 m (5' 8\")   Wt 110 kg (242 lb)   BMI " 36.80 kg/m²   GENERAL: pleasant, female in no apparent distress  HEENT: Normocephalic, atraumatic, mucus membranes moist and no lesions  NEURO: alert and oriented x3,exam grossly non-focal  NECK: Supple, full range of motion, no adenopathy and thyroid normal  DERMATOLOGY: Normal, without lesions, non-icteric and non-hirsute       ABDOMEN: soft, non-tender and no masses  PELVIC: external genitalia normal, normal Bartholin's glands, urethra, [place]'s glands, no vulvar lesions, no cervical lesions, good vaginal support, physiologic discharge present, normal appearing perineal body and perianal region  BIMANUAL: uterus normal size, shape and consistency, no adnexal masses and non-tender  Strings seen        ASSESSMENT and Plan:    Anna Ling is a 38 y.o. female,  who  presented with pelvic pain and AUB after IUD insertion     Plan is  - I  ordered transvaginal ultrasound    - FU after US             Jeffrey Simon MD

## 2024-07-11 ENCOUNTER — HOSPITAL ENCOUNTER (OUTPATIENT)
Dept: RADIOLOGY | Facility: HOSPITAL | Age: 39
Discharge: HOME | End: 2024-07-11
Payer: COMMERCIAL

## 2024-07-11 DIAGNOSIS — R10.2 PELVIC PAIN: ICD-10-CM

## 2024-07-11 PROCEDURE — 76856 US EXAM PELVIC COMPLETE: CPT

## 2024-07-17 NOTE — PROGRESS NOTES
"This is a 38 year old female for \"PPWK\" updates needed? Other?    She statse she is here for PPWK so she doesn't lose her job used to work for [employer] and now working other job from home and enjoyable.    She was apparently scheduled to have an MRI but states this was canceled        HAD HA predating this fall and seen at ER SEE ER NOTES and Dx was MIGRAINE but next day SUSTAINTED a FALL and reported to ER again and LEFT without being seen.    HAS HAD HEADACHES SINCE      Dx MIGRAINE    HAD MRI 2023 at [facility]:        [facility]  Outside Information  Results  MR brain wo IV contrast (Order [id number])     MR brain wo IV contrast  Order: [id number]  Impression    IMPRESSION:    Incidental arachnoid cyst in the cisterna vellum interpositum without  obstructive hydrocephalus. Otherwise normal study. No evidence of an  acute intracranial process.              Transcribed Using Voice Recognition  Transcribe Date/Time: Mar 23 2023  7:39A    Dictated by: NARCISA YEUNG MD    This examination was interpreted and the report reviewed and  electronically signed by:  NARCISA YEUNG MD on Mar 23 2023  7:45AM  EST  Narrative    * * *Final Report* * *    DATE OF EXAM: Mar 23 2023  7:36AM      [facility]   [id number]  -  MRI BRAIN WO IVCON  / ACCESSION #  [id number]    PROCEDURE REASON: Focal neuro deficit, new, fixed, or worsening, 4.5 to  24 hours, NIHSS < 6, strok        * * * * Physician Interpretation * * * *    RESULT: EXAMINATION: MRI BRAIN WO IVCON    CLINICAL HISTORY: Hypertension, diabetes, COPD, hyperlipidemia and  chronic tension headaches. New onset neck pain and right arm numbness.    TECHNIQUE: Routine noncontrast MRI protocol including diffusion images.  MQ: [id number]    COMPARISON: None.    RESULT:    Acute Change: There is no evidence of restricted diffusion to suggest an  acute infarct.    Hemorrhage: No evidence of prior parenchymal, subarachnoid or  intraventricular hemorrhage on SWI.    Mass Lesion/ Mass Effect: Large " extra-axial fluid collection is noted in  the cisterna vellum interpositum which follows the signal intensity  characteristics of CSF compatible with an arachnoid cyst measuring  approximately 4.2 x 4.0 x 3.4 cm in greatest AP, transverse, and CC  dimensions, respectively. This accounts for severe compression of the  dorsal bodies of lateral ventricles, mild compression of the atria, mild  compression of the superior colliculus, caudal displacement of the  internal cerebral veins and lateral and superior displacement of the  fornices but there is no obstructive hydrocephalus.  No evidence of an  intracranial mass elsewhere. No evidence of an extra-axial fluid  collection.    Chronic Change: The white matter is within normal limits of signal  intensity for age.    Parenchyma: No significant volume loss for age.  The brain parenchyma is  otherwise within normal limits of signal intensity and morphology.    Ventricles: No evidence of entrapment of the lateral ventricles by the  above-described arachnoid cyst.    Skull Base: Hypothalamic and pituitary region are grossly normal.    Craniocervical junction is normal. No significant marrow replacement  process. Incidentally noted are small retention cysts in the adenoid  tissue.    Vasculature: Major intracranial arterial structures, and dural venous  sinuses show typical flow void, suggesting patency by spin echo criteria.    Other: The visualized paranasal sinuses and mastoid air cells are clear.    The orbits and extracranial soft tissues are unremarkable.  Exam End: 03/23/23 07:36    Specimen Collected: 03/23/23 07:36 Last Resulted: 03/23/23 07:47   Received From[facility]  Result Received: 02/0         SEEN BY NEURO and was Rx for MRI with anxiolytic and was canceled for unknown reason      ED Course User Index  [SC] Rosie Mendoza PA-C           Diagnoses as of 01/25/24 1111   Migraine without status migrainosus, not intractable, unspecified migraine  type         ROS is NEG for HEADACHE, NAUSEA, VOMITING, DIARRHEA, CHEST PAIN, SOB, and BLEEDING and as further REVIEWED BELOW.    Subjective   Anna Ling is a 38 y.o. female who presents for Needs Work Letter (Pt will discuss with PCP the letter details) and FYI (Pt stated she had been seeing a neurologist and was supposed to have MRI done due to frequent HA but pt isn't sure why when she showed up for the scan they told her it was canceled. And  had prescribed med for anxiety to take for this specific scan. PAIN in head but also LOWER BODY tailbone and around entire buttocks (shooting pain going up spine)  pt fell down stairs in Feb. ).    HPI:    Per nursing intake, pt here for Needs Work Letter (Pt will discuss with PCP the letter details) and FYI (Pt stated she had been seeing a neurologist and was supposed to have MRI done due to frequent HA but pt isn't sure why when she showed up for the scan they told her it was canceled. And  had prescribed med for anxiety to take for this specific scan. PAIN in head but also LOWER BODY tailbone and around entire buttocks (shooting pain going up spine)  pt fell down stairs in Feb. )       Review of systems is essentially negative for all systems except for any identified issues in HPI above.    Objective     /80   Pulse 96   Temp 36.3 °C (97.4 °F)   Wt 109 kg (241 lb)   SpO2 99%   BMI 36.64 kg/m²      Physical Examination:       GENERAL           General Appearance: well-appearing, well-developed, well-hydrated, well-nourished, no acute distress.        HEENT           NECK supple, no masses or thyromegaly, no carotid bruit.        EYES           Extraocular Movements: normal, bilateral eyes CORDELL, no conjunctival injection.        HEART           Rate and Rhythm regular rate and rhythm. Heart sounds: normal S1S2, no S3 or S4. Murmurs: none.       NEURO CNS 2-12 NO GROSS ABN MENTAL STATUS INTACT DTR NORMAL AND SYMMETRICAL STRENGTH AND  COORDINATION WNL       CHEST           Breath sounds: Clear to IPPA, RR<16 no use of accessory muscles.        ABDOMEN           General: Neg for LKKS or masses, no scleral icterus or jaundice.        MUSCULOSKELETAL           Joints Demonstration: Neg for erythema, swelling or joint deformities. gross abnormalities no gross abnormalities.        EXTREMITIES           Lower Extremities: Neg for cyanosis, clubbing or edema.       Assessment/Plan       July 23, 2024      This 38 year old female has MEDICAL CONDITIONS that are more stable when she works remotely.  This note is to respectfully request that she be granted accommodation to continue to work remotely.          Laura Lee MD    Problem List Items Addressed This Visit    None  Visit Diagnoses       Nonintractable headache, unspecified chronicity pattern, unspecified headache type    -  Primary    Chronic post-traumatic headache, not intractable                FOLLOW UP:  PRN and as specified above         Laura Lee M.D.

## 2024-07-23 ENCOUNTER — OFFICE VISIT (OUTPATIENT)
Dept: PRIMARY CARE | Facility: CLINIC | Age: 39
End: 2024-07-23
Payer: COMMERCIAL

## 2024-07-23 ENCOUNTER — TELEPHONE (OUTPATIENT)
Dept: PRIMARY CARE | Facility: CLINIC | Age: 39
End: 2024-07-23

## 2024-07-23 VITALS
DIASTOLIC BLOOD PRESSURE: 80 MMHG | BODY MASS INDEX: 36.64 KG/M2 | OXYGEN SATURATION: 99 % | HEART RATE: 96 BPM | WEIGHT: 241 LBS | TEMPERATURE: 97.4 F | SYSTOLIC BLOOD PRESSURE: 132 MMHG

## 2024-07-23 DIAGNOSIS — G93.0 ARACHNOID CYST: ICD-10-CM

## 2024-07-23 DIAGNOSIS — R51.9 NONINTRACTABLE HEADACHE, UNSPECIFIED CHRONICITY PATTERN, UNSPECIFIED HEADACHE TYPE: Primary | ICD-10-CM

## 2024-07-23 DIAGNOSIS — G44.329 CHRONIC POST-TRAUMATIC HEADACHE, NOT INTRACTABLE: ICD-10-CM

## 2024-07-23 PROCEDURE — 99214 OFFICE O/P EST MOD 30 MIN: CPT | Performed by: FAMILY MEDICINE

## 2024-07-23 PROCEDURE — 3079F DIAST BP 80-89 MM HG: CPT | Performed by: FAMILY MEDICINE

## 2024-07-23 PROCEDURE — 3075F SYST BP GE 130 - 139MM HG: CPT | Performed by: FAMILY MEDICINE

## 2024-07-23 PROCEDURE — 3049F LDL-C 100-129 MG/DL: CPT | Performed by: FAMILY MEDICINE

## 2024-07-23 ASSESSMENT — PATIENT HEALTH QUESTIONNAIRE - PHQ9
2. FEELING DOWN, DEPRESSED OR HOPELESS: NOT AT ALL
1. LITTLE INTEREST OR PLEASURE IN DOING THINGS: NOT AT ALL
2. FEELING DOWN, DEPRESSED OR HOPELESS: NOT AT ALL
SUM OF ALL RESPONSES TO PHQ9 QUESTIONS 1 AND 2: 0
1. LITTLE INTEREST OR PLEASURE IN DOING THINGS: NOT AT ALL
SUM OF ALL RESPONSES TO PHQ9 QUESTIONS 1 AND 2: 0

## 2024-07-23 ASSESSMENT — ENCOUNTER SYMPTOMS
OCCASIONAL FEELINGS OF UNSTEADINESS: 0
DEPRESSION: 0
LOSS OF SENSATION IN FEET: 0

## 2024-07-23 ASSESSMENT — PAIN SCALES - GENERAL
PAINLEVEL: 9
PAINLEVEL: 8

## 2024-07-23 NOTE — TELEPHONE ENCOUNTER
Pt states that Dr. Lee filled out paperwork for her today, and that she accidentally put today's date as her birthday, pt would like to see how can she get this corrected, please call pt

## 2024-07-24 DIAGNOSIS — E11.69 TYPE 2 DIABETES MELLITUS WITH OTHER SPECIFIED COMPLICATION, WITH LONG-TERM CURRENT USE OF INSULIN (MULTI): ICD-10-CM

## 2024-07-24 DIAGNOSIS — Z79.4 TYPE 2 DIABETES MELLITUS WITH OTHER SPECIFIED COMPLICATION, WITH LONG-TERM CURRENT USE OF INSULIN (MULTI): ICD-10-CM

## 2024-07-24 RX ORDER — INSULIN GLARGINE 100 [IU]/ML
INJECTION, SOLUTION SUBCUTANEOUS
Qty: 50 ML | Refills: 1 | Status: SHIPPED | OUTPATIENT
Start: 2024-07-24

## 2024-07-26 NOTE — PROGRESS NOTES
Attestation signed by Sumit Kulkarni MD on 7/26/24 at 12:04 PM.    I, Dr Sumit Kulkarni, have reviewed this progress note, medication list, vital signs, any pertinent lab values, and any CGM data if present with the [facility] and  face to face during this visit today. This note reflects the treatment plan that was made under my direction after reviewing the above mentioned elements while face to face with the patient and CDE.  I personally answered and addressed any questions and concerns the patient had during the visit today.  The CDE entered the data in this note under my direction and I personally reviewed it, signed any lab or medication orders that I instructed to be completed. I am the billing provider for this visit and the level of service was determined by my involvement in the Medical Decision Making Component of this visit while face to face with the patient.

## 2024-08-01 ENCOUNTER — TELEPHONE (OUTPATIENT)
Dept: PRIMARY CARE | Facility: CLINIC | Age: 39
End: 2024-08-01
Payer: COMMERCIAL

## 2024-08-01 NOTE — TELEPHONE ENCOUNTER
Pt states that she has an MRI scheduled for 08/05/24 and would like to know if she could have something for anxiety, please advise pt

## 2024-08-01 NOTE — TELEPHONE ENCOUNTER
Patient states she has MRI scheduled on Monday and she has anxiety - she is asking for rx to help with her anxiety during the MRI.

## 2024-08-01 NOTE — TELEPHONE ENCOUNTER
PT's employer calling stating that PT turned in a paper regarding work from home accommodations, and employer has further questions. Sugar Michaud From [facility] requesting a call back at [phone]

## 2024-08-05 ENCOUNTER — TELEMEDICINE (OUTPATIENT)
Dept: PRIMARY CARE | Facility: CLINIC | Age: 39
End: 2024-08-05
Payer: COMMERCIAL

## 2024-08-05 DIAGNOSIS — F40.240 CLAUSTROPHOBIA: Primary | ICD-10-CM

## 2024-08-05 PROCEDURE — 99442 PR PHYS/QHP TELEPHONE EVALUATION 11-20 MIN: CPT | Performed by: FAMILY MEDICINE

## 2024-08-05 PROCEDURE — 3049F LDL-C 100-129 MG/DL: CPT | Performed by: FAMILY MEDICINE

## 2024-08-05 RX ORDER — LORAZEPAM 0.5 MG/1
.5-1 TABLET ORAL SEE ADMIN INSTRUCTIONS
Qty: 10 TABLET | Refills: 0 | Status: SHIPPED | OUTPATIENT
Start: 2024-08-05 | End: 2024-08-07

## 2024-08-05 ASSESSMENT — PATIENT HEALTH QUESTIONNAIRE - PHQ9
1. LITTLE INTEREST OR PLEASURE IN DOING THINGS: NOT AT ALL
SUM OF ALL RESPONSES TO PHQ9 QUESTIONS 1 AND 2: 0
2. FEELING DOWN, DEPRESSED OR HOPELESS: NOT AT ALL

## 2024-08-05 ASSESSMENT — ENCOUNTER SYMPTOMS
LOSS OF SENSATION IN FEET: 0
DEPRESSION: 0
OCCASIONAL FEELINGS OF UNSTEADINESS: 0

## 2024-08-05 NOTE — PROGRESS NOTES
This is a 38 year old female preparing for an MRI TEST and has had CLAUSTROPHOBIA and is requesting ativan for the testing today at 6 PM    HAS SOMEONE to drive her back and forth    Counseling:   Medication Education:   Education: The patient is counseled regarding potential side-effects of all new medications   Understanding: Patient expressed understanding   Adherence: No barriers to adherence identified

## 2024-08-06 ENCOUNTER — APPOINTMENT (OUTPATIENT)
Dept: PRIMARY CARE | Facility: CLINIC | Age: 39
End: 2024-08-06
Payer: COMMERCIAL

## 2024-08-07 NOTE — PROGRESS NOTES
"HPI   39 yo pmh dm2 (dx 2020), dyslipidemia, depression/anxiety, headaches presents in follow up.  Last A1c-6.8%, today 6.7%.       Checking sugars 2+ times a day with Bill 2, using rigoberto.   No recent blood sugar tests.  No low sugars. Pt is doing better with carb control in the diet. Pt is not all that active.         Taking Trulicity 4.5 mg weekly, Lantus 20 unit at bedtime (was 40), and glimepiride 4mg daily.   -doesn't tolerate metformin     30 day bill 3 data: 94% in range, 0% low, pattern: low/mid 100's through the day             Was to be taking atorvastatin  daily for lipids and tolerating, not currently taking.      -not currently on bp meds, at target off meds           Still struggling with mood, looking for another counselor.          Seeing neurology for headaches and on meds.      Current Outpatient Medications:     FreeStyle Bill 3 Sensor device, Check sugars daily. Apply new sensor every 14 days., Disp: 6 each, Rfl: 3    glimepiride (AmaryL) 4 mg tablet, Take 1 tablet (4 mg) by mouth once daily in the morning. Take before meals., Disp: 90 tablet, Rfl: 3    insulin glargine (Lantus Solostar U-100 Insulin) 100 unit/mL (3 mL) pen, USE 50 UNITS SUBCUTANEOUS EVERY DAY 30, Disp: 50 mL, Rfl: 1    LORazepam (Ativan) 0.5 mg tablet, Take 1-2 tablets (0.5-1 mg) by mouth see administration instructions for 2 days. Take 30 minutes prior to procedure., Disp: 10 tablet, Rfl: 0    Mirena 21 mcg/24 hours (8 yrs) 52 mg IUD, , Disp: , Rfl:     pen needle, diabetic 32 gauge x 5/32\" needle, once every 24 hours., Disp: , Rfl:     topiramate (Topamax) 25 mg tablet, Take 2 tablets (50 mg) by mouth once daily at bedtime., Disp: 30 tablet, Rfl: 2    Trulicity 4.5 mg/0.5 mL pen injector, Inject 4.5 mg under the skin 1 (one) time per week., Disp: 2 mL, Rfl: 5      Allergies as of 08/09/2024    (No Known Allergies)         Review of Systems   Cardiology: Lightheadedness-denies.  Chest pain-denies.  Leg edema-denies.  " "Palpitations-denies.  Respiratory: Cough-denies. Shortness of breath-denies.  Wheezing-denies.  Gastroenterology: Constipation-denies.  Diarrhea-denies.  Heartburn-denies.  Endocrinology: Cold intolerance-denies.  Heat intolerance-denies.  Sweats-denies.  Neurology: Headache +.  Tremor-denies.  Neuropathy in extremities-denies.  Psychology: Low energy +.  Irritability-denies.  Sleep disturbances +      /84 (BP Location: Right arm, Patient Position: Sitting)   Pulse 98   Wt 109 kg (239 lb 12.8 oz)   LMP  (LMP Unknown)   BMI 36.46 kg/m²       Labs:  Lab Results   Component Value Date    WBC 9.9 03/01/2024    NRBC 0.0 03/01/2024    RBC 4.26 03/01/2024    HGB 12.3 03/01/2024    HCT 38.2 03/01/2024     03/01/2024     Lab Results   Component Value Date    CALCIUM 8.9 03/01/2024    AST 13 03/01/2024    ALKPHOS 101 03/01/2024    BILITOT <0.2 03/01/2024    PROT 7.3 03/01/2024    ALBUMIN 4.2 03/01/2024     03/01/2024    K 4.2 03/01/2024     03/01/2024    CO2 24 03/01/2024    ANIONGAP 12 03/01/2024    BUN 6 (L) 03/01/2024    CREATININE 0.70 03/01/2024    UREACREAUR 11.7 04/23/2021    GLUCOSE 77 03/01/2024    ALT 14 03/01/2024    EGFR >90 03/01/2024     Lab Results   Component Value Date    CHOL 173 03/01/2024    TRIG 78 03/01/2024    HDL 49.0 (L) 03/01/2024    LDLCALC 108 03/01/2024     No results found for: \"MICROALBCREA\"  Lab Results   Component Value Date    TSH 1.68 03/01/2024     Lab Results   Component Value Date    [id number] 300 04/23/2021     Lab Results   Component Value Date    HGBA1C 6.7 (A) 08/09/2024         Physical Exam   General Appearance: pleasant, cooperative, no acute distress  HEENT: no chemosis, no proptosis, no lid lag, no lid retraction  Neck: no lymphadenopathy, no thyromegaly, no dominant thyroid nodules  Heart: no murmurs, regular rate and rhythm, S1 and S2  Lungs: no wheezes, no rhonci, no rales  Extremities: no lower extremity swelling      Assessment/Plan   1. Type 2 " diabetes mellitus with other specified complication, unspecified whether long term insulin use (Multi)  -A1c ordered and reviewed  -adal data reviewed  -labs reviewed    -good control, titrate insulin for am sugars 100-130 range  -could look into stronger glp-1ra in future if covered  -add jardiance 25mg daily, reviewed risk/benefits/warnings  -lower insulin to 10 units, stop if still low    -please have eye exam    2. Mixed hyperlipidemia  -please go back on atorvastatin 10mg daily         Follow Up:  reese PradoD 4 months    Medical Decision Making  Complexity of problem: Chronic illness of diabetes mellitus uncontrolled, progressing  Data analyzed and reviewed: Reviewed prior notes, blood glucose data, labs including HgbA1c, lipids, serum chemistries.  Ordered tests.   Risk of complications and morbidities: Is definite because of use of insulin and risk of hypoglycemia.  Prescription medications reviewed and modifications made.  Compliance assessed.  Addressed social determinants of health including food insecurity.

## 2024-08-09 ENCOUNTER — APPOINTMENT (OUTPATIENT)
Dept: ENDOCRINOLOGY | Facility: CLINIC | Age: 39
End: 2024-08-09
Payer: COMMERCIAL

## 2024-08-09 VITALS
WEIGHT: 239.8 LBS | HEART RATE: 98 BPM | BODY MASS INDEX: 36.46 KG/M2 | DIASTOLIC BLOOD PRESSURE: 84 MMHG | SYSTOLIC BLOOD PRESSURE: 110 MMHG

## 2024-08-09 DIAGNOSIS — E11.69 TYPE 2 DIABETES MELLITUS WITH OTHER SPECIFIED COMPLICATION, UNSPECIFIED WHETHER LONG TERM INSULIN USE (MULTI): Primary | ICD-10-CM

## 2024-08-09 DIAGNOSIS — E78.2 MIXED HYPERLIPIDEMIA: ICD-10-CM

## 2024-08-09 LAB — POC HEMOGLOBIN A1C: 6.7 % (ref 4.2–6.5)

## 2024-08-09 PROCEDURE — 3079F DIAST BP 80-89 MM HG: CPT | Performed by: INTERNAL MEDICINE

## 2024-08-09 PROCEDURE — 95251 CONT GLUC MNTR ANALYSIS I&R: CPT | Performed by: INTERNAL MEDICINE

## 2024-08-09 PROCEDURE — 4004F PT TOBACCO SCREEN RCVD TLK: CPT | Performed by: INTERNAL MEDICINE

## 2024-08-09 PROCEDURE — 3049F LDL-C 100-129 MG/DL: CPT | Performed by: INTERNAL MEDICINE

## 2024-08-09 PROCEDURE — 83036 HEMOGLOBIN GLYCOSYLATED A1C: CPT | Performed by: INTERNAL MEDICINE

## 2024-08-09 PROCEDURE — 99214 OFFICE O/P EST MOD 30 MIN: CPT | Performed by: INTERNAL MEDICINE

## 2024-08-09 PROCEDURE — 3074F SYST BP LT 130 MM HG: CPT | Performed by: INTERNAL MEDICINE

## 2024-08-09 RX ORDER — ATORVASTATIN CALCIUM 10 MG/1
10 TABLET, FILM COATED ORAL DAILY
Qty: 90 TABLET | Refills: 1 | Status: SHIPPED | OUTPATIENT
Start: 2024-08-09 | End: 2025-08-09

## 2024-08-09 ASSESSMENT — ENCOUNTER SYMPTOMS: DEPRESSION: 1

## 2024-08-09 ASSESSMENT — PAIN SCALES - GENERAL: PAINLEVEL: 4

## 2024-08-09 ASSESSMENT — PATIENT HEALTH QUESTIONNAIRE - PHQ9
2. FEELING DOWN, DEPRESSED OR HOPELESS: SEVERAL DAYS
1. LITTLE INTEREST OR PLEASURE IN DOING THINGS: SEVERAL DAYS
SUM OF ALL RESPONSES TO PHQ9 QUESTIONS 1 AND 2: 2
10. IF YOU CHECKED OFF ANY PROBLEMS, HOW DIFFICULT HAVE THESE PROBLEMS MADE IT FOR YOU TO DO YOUR WORK, TAKE CARE OF THINGS AT HOME, OR GET ALONG WITH OTHER PEOPLE: SOMEWHAT DIFFICULT

## 2024-08-12 ENCOUNTER — HOSPITAL ENCOUNTER (OUTPATIENT)
Dept: RADIOLOGY | Facility: HOSPITAL | Age: 39
Discharge: HOME | End: 2024-08-12
Payer: COMMERCIAL

## 2024-08-12 DIAGNOSIS — G93.0 ARACHNOID CYST: ICD-10-CM

## 2024-08-12 DIAGNOSIS — G44.329 CHRONIC POST-TRAUMATIC HEADACHE, NOT INTRACTABLE: ICD-10-CM

## 2024-08-12 DIAGNOSIS — R51.9 NONINTRACTABLE HEADACHE, UNSPECIFIED CHRONICITY PATTERN, UNSPECIFIED HEADACHE TYPE: ICD-10-CM

## 2024-08-12 PROCEDURE — 2500000004 HC RX 250 GENERAL PHARMACY W/ HCPCS (ALT 636 FOR OP/ED): Performed by: FAMILY MEDICINE

## 2024-08-12 PROCEDURE — 70553 MRI BRAIN STEM W/O & W/DYE: CPT

## 2024-08-12 PROCEDURE — 70553 MRI BRAIN STEM W/O & W/DYE: CPT | Performed by: RADIOLOGY

## 2024-08-12 PROCEDURE — A9575 INJ GADOTERATE MEGLUMI 0.1ML: HCPCS | Performed by: FAMILY MEDICINE

## 2024-08-12 RX ORDER — GADOTERATE MEGLUMINE 376.9 MG/ML
20 INJECTION INTRAVENOUS
Status: COMPLETED | OUTPATIENT
Start: 2024-08-12 | End: 2024-08-12

## 2024-08-15 ENCOUNTER — APPOINTMENT (OUTPATIENT)
Dept: ENDOCRINOLOGY | Facility: CLINIC | Age: 39
End: 2024-08-15
Payer: COMMERCIAL

## 2024-09-06 ENCOUNTER — TELEPHONE (OUTPATIENT)
Dept: PRIMARY CARE | Facility: CLINIC | Age: 39
End: 2024-09-06
Payer: COMMERCIAL

## 2024-09-06 NOTE — TELEPHONE ENCOUNTER
Sugar Michaud,  of  of [employer] [facility]. States she is calling in regards to the patient which is one of their employees. Wants to discuss the request for reasonable accommodations and what type of accommodations would best suit the patient. Would like a call back at [phone].

## 2024-09-10 NOTE — TELEPHONE ENCOUNTER
Call placed to patient, states she does have paperwork that needs updated and wishes to see Dr. Lee. Is not sure if Dr. Rosa alvarenga office accepts her insurance. Pt was given the phone number for Dr. Lee' [place] office and advised to call back if she needs to continue care with our [facility] office. Pt verbalized understanding.

## 2024-10-15 ENCOUNTER — APPOINTMENT (OUTPATIENT)
Dept: NEUROLOGY | Facility: HOSPITAL | Age: 39
End: 2024-10-15
Payer: COMMERCIAL

## 2024-11-19 DIAGNOSIS — E11.69 TYPE 2 DIABETES MELLITUS WITH OTHER SPECIFIED COMPLICATION, UNSPECIFIED WHETHER LONG TERM INSULIN USE (MULTI): ICD-10-CM

## 2024-11-19 RX ORDER — DULAGLUTIDE 4.5 MG/.5ML
4.5 INJECTION, SOLUTION SUBCUTANEOUS
Qty: 2 ML | Refills: 5 | Status: SHIPPED | OUTPATIENT
Start: 2024-11-19

## 2024-12-09 ENCOUNTER — APPOINTMENT (OUTPATIENT)
Dept: ENDOCRINOLOGY | Facility: CLINIC | Age: 39
End: 2024-12-09
Payer: COMMERCIAL

## 2024-12-09 VITALS
HEART RATE: 88 BPM | WEIGHT: 235.8 LBS | SYSTOLIC BLOOD PRESSURE: 129 MMHG | BODY MASS INDEX: 34.82 KG/M2 | DIASTOLIC BLOOD PRESSURE: 86 MMHG

## 2024-12-09 DIAGNOSIS — E78.2 MIXED HYPERLIPIDEMIA: ICD-10-CM

## 2024-12-09 DIAGNOSIS — E11.69 TYPE 2 DIABETES MELLITUS WITH OTHER SPECIFIED COMPLICATION, UNSPECIFIED WHETHER LONG TERM INSULIN USE (MULTI): Primary | ICD-10-CM

## 2024-12-09 DIAGNOSIS — I10 PRIMARY HYPERTENSION: ICD-10-CM

## 2024-12-09 LAB — POC HEMOGLOBIN A1C: 6 % (ref 4.2–6.5)

## 2024-12-09 PROCEDURE — 83036 HEMOGLOBIN GLYCOSYLATED A1C: CPT | Performed by: INTERNAL MEDICINE

## 2024-12-09 PROCEDURE — 99213 OFFICE O/P EST LOW 20 MIN: CPT | Performed by: INTERNAL MEDICINE

## 2024-12-09 PROCEDURE — 95251 CONT GLUC MNTR ANALYSIS I&R: CPT | Performed by: INTERNAL MEDICINE

## 2024-12-09 RX ORDER — LANCETS 33 GAUGE
EACH MISCELLANEOUS
Qty: 100 EACH | Refills: 1 | Status: SHIPPED | OUTPATIENT
Start: 2024-12-09

## 2024-12-09 RX ORDER — BLOOD-GLUCOSE METER
EACH MISCELLANEOUS
Qty: 100 STRIP | Refills: 1 | Status: SHIPPED | OUTPATIENT
Start: 2024-12-09

## 2024-12-09 RX ORDER — DEXTROSE 4 G
TABLET,CHEWABLE ORAL
Qty: 1 EACH | Refills: 0 | Status: SHIPPED | OUTPATIENT
Start: 2024-12-09

## 2024-12-09 NOTE — PROGRESS NOTES
Attestation signed by Sumit Kulkarni MD on 12/9/24 at 12:03 PM.    I, Dr Sumit Kulkarni, have reviewed this progress note, medication list, vital signs, any pertinent lab values, and any CGM data if present with the [facility] and  face to face during this visit today. This note reflects the treatment plan that was made under my direction after reviewing the above mentioned elements while face to face with the patient and CDE.  I personally answered and addressed any questions and concerns the patient had during the visit today.  The CDE entered the data in this note under my direction and I personally reviewed it, signed any lab or medication orders that I instructed to be completed. I am the billing provider for this visit and the level of service was determined by my involvement in the Medical Decision Making Component of this visit while face to face with the patient.

## 2024-12-09 NOTE — PROGRESS NOTES
HPI     38 yo pmh dm2 (dx 2020), dyslipidemia, depression/anxiety, headaches presents in follow up.  Last A1c-6.7%, today 6.0%.       Checking sugars >4 times/day using Bill 3 w/ rigoberto.  No low sugars. Pt is doing better with carb control in the diet. Pt is not all that active.         Taking Trulicity 4.5 mg weekly, jardiance 25mg -added last visit, and glimepiride 4mg daily.   -off lantus 10 units at bedtime since last visit  -doesn't tolerate metformin     30 day bill 3 data:  98% in range, 2% low, pattern: low/mid 100's through the day    Restarted atorvastatin 10mg daily for lipids and tolerating after last visit.      -not currently on bp meds, at target off meds           Still struggling with mood, seeing new counselor.          Seeing neurology for headaches and on meds.        Current Outpatient Medications:     atorvastatin (Lipitor) 10 mg tablet, Take 1 tablet (10 mg) by mouth once daily., Disp: 90 tablet, Rfl: 1    empagliflozin (Jardiance) 25 mg, Take 1 tablet (25 mg) by mouth once daily., Disp: 30 tablet, Rfl: 5    FreeStyle Bill 3 Sensor device, Check sugars daily. Apply new sensor every 14 days., Disp: 6 each, Rfl: 3    glimepiride (AmaryL) 4 mg tablet, Take 1 tablet (4 mg) by mouth once daily in the morning. Take before meals., Disp: 90 tablet, Rfl: 3    Mirena 21 mcg/24 hours (8 yrs) 52 mg IUD, , Disp: , Rfl:     topiramate (Topamax) 25 mg tablet, Take 2 tablets (50 mg) by mouth once daily at bedtime., Disp: 30 tablet, Rfl: 2    Trulicity 4.5 mg/0.5 mL pen injector, Inject 4.5 mg under the skin 1 (one) time per week., Disp: 2 mL, Rfl: 5    blood sugar diagnostic (OneTouch Verio test strips) strip, As directed to check blood sugar daily, Disp: 100 strip, Rfl: 1    blood-glucose meter (OneTouch Verio Flex meter) misc, As directed to check blood sugar daily, Disp: 1 each, Rfl: 0    lancets (OneTouch Delica Plus Lancet) 33 gauge misc, As directed to check blood sugar daily, Disp: 100 each, Rfl: 1     "LORazepam (Ativan) 0.5 mg tablet, Take 1-2 tablets (0.5-1 mg) by mouth see administration instructions for 2 days. Take 30 minutes prior to procedure., Disp: 10 tablet, Rfl: 0    Allergies as of 12/09/2024    (No Known Allergies)       /86   Pulse 88   Wt 107 kg (235 lb 12.8 oz)   BMI 34.82 kg/m²     Labs:   Lab Results   Component Value Date    WBC 9.9 03/01/2024    NRBC 0.0 03/01/2024    RBC 4.26 03/01/2024    HGB 12.3 03/01/2024    HCT 38.2 03/01/2024     03/01/2024     Lab Results   Component Value Date    CALCIUM 8.9 03/01/2024    AST 13 03/01/2024    ALKPHOS 101 03/01/2024    BILITOT <0.2 03/01/2024    PROT 7.3 03/01/2024    ALBUMIN 4.2 03/01/2024     03/01/2024    K 4.2 03/01/2024     03/01/2024    CO2 24 03/01/2024    ANIONGAP 12 03/01/2024    BUN 6 (L) 03/01/2024    CREATININE 0.70 03/01/2024    UREACREAUR 11.7 04/23/2021    GLUCOSE 77 03/01/2024    ALT 14 03/01/2024    EGFR >90 03/01/2024     Lab Results   Component Value Date    CHOL 173 03/01/2024    TRIG 78 03/01/2024    HDL 49.0 (L) 03/01/2024    LDLCALC 108 03/01/2024     No results found for: \"MICROALBCREA\"  Lab Results   Component Value Date    TSH 1.68 03/01/2024     Lab Results   Component Value Date    [id number] 300 04/23/2021     Lab Results   Component Value Date    HGBA1C 6.0 12/09/2024         Assessment/Plan   1. Type 2 diabetes mellitus with other specified complication, unspecified whether long term insulin use (Multi) (Primary)    -A1c ordered and reviewed  -adal data reviewed  -labs reviewed & ordered to repeat for next visit    -great improvement in glycemic control since last visit,  off basal insulin   -stronger glp-1ra not covered, continue trulicity   -if sugars <100, cut glimepiride in half or stop     2. Mixed hyperlipidemia  -back on atorvastatin 10mg daily since last visit, repeat fasting labs       Follow up: 6mon bb    -labs/tests/notes reviewed  -reviewed and counseled patient on medication " monitoring and side effects    Treatment and plan discussed with Dr. Kulkarni.  YASMINE Ortiz, PharmD, BC-[facility], [facility].

## 2024-12-09 NOTE — PATIENT INSTRUCTIONS
If sugars start dropping below 100,  cut glimepiride in half   -if still dropping below 100, then stop glimepiride    Keep up the great work!  Lab Results   Component Value Date    HGBA1C 6.0 12/09/2024

## 2025-02-27 ENCOUNTER — OFFICE VISIT (OUTPATIENT)
Dept: OBSTETRICS AND GYNECOLOGY | Facility: CLINIC | Age: 40
End: 2025-02-27
Payer: COMMERCIAL

## 2025-02-27 VITALS
DIASTOLIC BLOOD PRESSURE: 73 MMHG | HEIGHT: 68 IN | WEIGHT: 239.5 LBS | SYSTOLIC BLOOD PRESSURE: 129 MMHG | BODY MASS INDEX: 36.3 KG/M2

## 2025-02-27 DIAGNOSIS — N76.0 ACUTE VAGINITIS: ICD-10-CM

## 2025-02-27 DIAGNOSIS — R10.2 PELVIC PAIN IN FEMALE: Primary | ICD-10-CM

## 2025-02-27 DIAGNOSIS — R87.619 ABNORMAL CERVICAL PAPANICOLAOU SMEAR, UNSPECIFIED ABNORMAL PAP FINDING: ICD-10-CM

## 2025-02-27 PROCEDURE — 87661 TRICHOMONAS VAGINALIS AMPLIF: CPT | Performed by: OBSTETRICS & GYNECOLOGY

## 2025-02-27 PROCEDURE — 99214 OFFICE O/P EST MOD 30 MIN: CPT | Performed by: OBSTETRICS & GYNECOLOGY

## 2025-02-27 PROCEDURE — 3078F DIAST BP <80 MM HG: CPT | Performed by: OBSTETRICS & GYNECOLOGY

## 2025-02-27 PROCEDURE — 3074F SYST BP LT 130 MM HG: CPT | Performed by: OBSTETRICS & GYNECOLOGY

## 2025-02-27 PROCEDURE — 3008F BODY MASS INDEX DOCD: CPT | Performed by: OBSTETRICS & GYNECOLOGY

## 2025-02-27 PROCEDURE — 87591 N.GONORRHOEAE DNA AMP PROB: CPT | Performed by: OBSTETRICS & GYNECOLOGY

## 2025-02-27 ASSESSMENT — ENCOUNTER SYMPTOMS
LOSS OF SENSATION IN FEET: 0
DEPRESSION: 0
OCCASIONAL FEELINGS OF UNSTEADINESS: 0

## 2025-02-27 ASSESSMENT — PAIN SCALES - GENERAL: PAINLEVEL_OUTOF10: 6

## 2025-02-27 NOTE — PROGRESS NOTES
Anna Ling is a 39 y.o. female,  who presented with  pelvic pain      Crampy   7/10   Increases with movement  Dysparunia   Pinching sensation on the left side           Obstetrical History:  OB History          2    Para   2    Term                AB        Living   3         SAB        IAB        Ectopic        Multiple   1    Live Births   3                          Vaginal discharge    yes  Menopausal symptoms No        Last Pap:  History of abnormal pap exams? Yes      Past Medical History:   Diagnosis Date    Atypical squamous cells of undetermined significance on cytologic smear of cervix (ASC-US) 2013    Pap smear abnormality of cervix with ASCUS favoring benign    Chlamydial infection, unspecified     Disease due to chlamydiae    Controlled type 2 diabetes mellitus with hyperglycemia, without long-term current use of insulin     Encounter for gynecological examination (general) (routine) without abnormal findings     Encounter for routine gynecological examination    Essential (primary) hypertension     Mixed hyperlipidemia     Other conditions influencing health status     Cervical Dysplasia    Other conditions influencing health status     Cervical Intraepithelial Glandular Neoplasia    Personal history of other diseases of the nervous system and sense organs     History of blurred vision    Primary hypertension     Type 2 diabetes mellitus with other specified complication, unspecified whether long term insulin use (Multi)      Past Surgical History:   Procedure Laterality Date    CERVICAL BIOPSY  W/ LOOP ELECTRODE EXCISION  2013    Cervical Loop Electrosurgical Excision (LEEP)    COLPOSCOPY  2013    Colposcopy    OTHER SURGICAL HISTORY  2013    Colposcopy With Loop Electrode Excision Of The Cervix     No family history on file.  Social History     Tobacco Use    Smoking status: Every Day     Types: Cigars     Passive exposure: Current    Smokeless  tobacco: Never    Tobacco comments:     3 cigars   Vaping Use    Vaping status: Never Used   Substance Use Topics    Alcohol use: Yes     Comment: rare    Drug use: Defer     Social History     Tobacco Use   Smoking Status Every Day    Types: Cigars    Passive exposure: Current   Smokeless Tobacco Never   Tobacco Comments    3 cigars       Current Outpatient Medications on File Prior to Visit   Medication Sig Dispense Refill    atorvastatin (Lipitor) 10 mg tablet Take 1 tablet (10 mg) by mouth once daily. 90 tablet 1    blood sugar diagnostic (OneTouch Verio test strips) strip As directed to check blood sugar daily 100 strip 1    blood-glucose meter (OneTouch Verio Flex meter) misc As directed to check blood sugar daily 1 each 0    empagliflozin (Jardiance) 25 mg Take 1 tablet (25 mg) by mouth once daily. 30 tablet 5    FreeStyle Bill 3 Sensor device Check sugars daily. Apply new sensor every 14 days. 6 each 3    glimepiride (AmaryL) 4 mg tablet Take 1 tablet (4 mg) by mouth once daily in the morning. Take before meals. 90 tablet 3    lancets (OneTouch Delica Plus Lancet) 33 gauge misc As directed to check blood sugar daily 100 each 1    LORazepam (Ativan) 0.5 mg tablet Take 1-2 tablets (0.5-1 mg) by mouth see administration instructions for 2 days. Take 30 minutes prior to procedure. 10 tablet 0    Mirena 21 mcg/24 hours (8 yrs) 52 mg IUD       topiramate (Topamax) 25 mg tablet Take 2 tablets (50 mg) by mouth once daily at bedtime. 30 tablet 2    Trulicity 4.5 mg/0.5 mL pen injector Inject 4.5 mg under the skin 1 (one) time per week. 2 mL 5     No current facility-administered medications on file prior to visit.     No Known Allergies      REVIEW OF SYSTEMS:    General: No weight loss, malaise or fevers or other constitutional symptoms.  Neuro: No history of TIA's, stroke,.  No neurological symptoms or problems. No visual changes  Respiratory: No history of respiratory/pulmonary symptoms or  "problems.  Cardiovascular: No history of cardiovascular symptoms or problems.  GI: No history of GI symptoms or problems.   : No history of UTI in past 6 weeks.  No history of  symptoms or problems.  BREASTS: No skin dimpling, nipple discharge or masses.  Endocrine: No history of diabetes. No Thyroid symptoms  Hematology: No history of bleeding or clotting disorder.  Skin: Negative for lesions, rash, and itching.      PHYSICAL EXAMINATION:    /73   Ht 1.727 m (5' 8\")   Wt 109 kg (239 lb 8 oz)   LMP 2025 Comment: slight bleeding for 2 days  BMI 36.42 kg/m²   GENERAL: pleasant, female in no apparent distress  HEENT: Normocephalic, atraumatic, mucus membranes moist and no lesions  NEURO: alert and oriented x3,exam grossly non-focal  NECK: Supple, full range of motion, no adenopathy and thyroid normal  DERMATOLOGY: Normal, without lesions, non-icteric and non-hirsute       ABDOMEN: soft, non-tender and no masses  PELVIC: external genitalia normal, normal Bartholin's glands, urethra, [employer]'s glands, no vulvar lesions, no cervical lesions, good vaginal support, physiologic discharge present, normal appearing perineal body and perianal region  BIMANUAL: uterus normal size, shape and consistency, no adnexal masses and non-tender        ASSESSMENT and Plan:    Anna Ling is a 39 y.o. female,  who  presented with   Diagnoses and all orders for this visit:  Pelvic pain in female  -     US PELVIS TRANSABDOMINAL WITH TRANSVAGINAL; Future  -     Vaginitis Gram Stain For Bacterial Vaginosis + Yeast  -     THINPREP PAP TEST  Acute vaginitis  -     US PELVIS TRANSABDOMINAL WITH TRANSVAGINAL; Future  -     Vaginitis Gram Stain For Bacterial Vaginosis + Yeast  -     THINPREP PAP TEST  Abnormal cervical Papanicolaou smear, unspecified abnormal pap finding  -     THINPREP PAP TEST    Plan is  - I  ordered transvaginal ultrasound    - I did vaginal swab  - FU after US             Jeffrey Simon MD    "

## 2025-02-28 LAB
C TRACH RRNA SPEC QL NAA+PROBE: NEGATIVE
N GONORRHOEA DNA SPEC QL PROBE+SIG AMP: NEGATIVE
T VAGINALIS RRNA SPEC QL NAA+PROBE: NEGATIVE

## 2025-03-01 LAB — BV SCORE VAG QL: NORMAL

## 2025-03-14 LAB
CYTOLOGY CMNT CVX/VAG CYTO-IMP: NORMAL
LAB AP HPV GENOTYPE QUESTION: YES
LAB AP HPV HR: NORMAL
LAB AP PAP ADDITIONAL TESTS: NORMAL
LABORATORY COMMENT REPORT: NORMAL
LMP START DATE: NORMAL
PATH REPORT.TOTAL CANCER: NORMAL

## 2025-04-25 DIAGNOSIS — E11.69 TYPE 2 DIABETES MELLITUS WITH OTHER SPECIFIED COMPLICATION, UNSPECIFIED WHETHER LONG TERM INSULIN USE (MULTI): ICD-10-CM

## 2025-04-25 RX ORDER — BLOOD-GLUCOSE SENSOR
EACH MISCELLANEOUS
Qty: 2 EACH | Refills: 11 | Status: SHIPPED | OUTPATIENT
Start: 2025-04-25

## 2025-06-09 NOTE — PROGRESS NOTES
HPI   40 yo pmh dm2 (dx 2020), dyslipidemia, depression/anxiety, headaches presents in follow up.  Last A1c-6.0%, today 6.3%.       Checking sugars daily, low 100's prior to meals, up to 170 post meal.  No low sugars. Pt is doing better with carb control in the diet. Pt is not all that active.  -no longer using adal 3, was having a lot of false low alerts         Taking Trulicity 4.5 mg weekly, jardiance 25mg  -no longer on glimepiride   -off lantus 10 units at bedtime   -doesn't tolerate metformin      -taking atorvastatin 10mg daily for lipids and tolerating      -not currently on bp meds, at target off meds           Working on mood, seeing counselor.          Seeing neurology for headaches and on meds.        Current Outpatient Medications:     atorvastatin (Lipitor) 10 mg tablet, Take 1 tablet (10 mg) by mouth once daily., Disp: 90 tablet, Rfl: 1    blood sugar diagnostic (OneTouch Verio test strips) strip, As directed to check blood sugar daily, Disp: 100 strip, Rfl: 1    blood-glucose meter (OneTouch Verio Flex meter) misc, As directed to check blood sugar daily, Disp: 1 each, Rfl: 0    empagliflozin (Jardiance) 25 mg, Take 1 tablet (25 mg) by mouth once daily., Disp: 30 tablet, Rfl: 5    lancets (OneTouch Delica Plus Lancet) 33 gauge misc, As directed to check blood sugar daily, Disp: 100 each, Rfl: 1    LORazepam (Ativan) 0.5 mg tablet, Take 1-2 tablets (0.5-1 mg) by mouth see administration instructions for 2 days. Take 30 minutes prior to procedure., Disp: 10 tablet, Rfl: 0    Mirena 21 mcg/24 hours (8 yrs) 52 mg IUD, , Disp: , Rfl:     topiramate (Topamax) 25 mg tablet, Take 2 tablets (50 mg) by mouth once daily at bedtime., Disp: 30 tablet, Rfl: 2    Trulicity 4.5 mg/0.5 mL pen injector, Inject 4.5 mg under the skin 1 (one) time per week., Disp: 2 mL, Rfl: 5      Allergies as of 06/10/2025    (No Known Allergies)         Review of Systems   Cardiology: Lightheadedness-denies.  Chest pain-denies.  Leg  "edema-denies.  Palpitations-denies.  Respiratory: Cough-denies. Shortness of breath-denies.  Wheezing-denies.  Gastroenterology: Constipation-denies.  Diarrhea-denies.  Heartburn-denies.  Endocrinology: Cold intolerance-denies.  Heat intolerance-denies.  Sweats-denies.  Neurology: Headache-denies.  Tremor-denies.  Neuropathy in extremities-denies.  Psychology: Low energy-denies.  Irritability-denies.  Sleep disturbances-denies.      /83 (BP Location: Right arm, Patient Position: Sitting, BP Cuff Size: Adult)   Pulse 77   Wt 105 kg (232 lb 3.2 oz)   LMP  (LMP Unknown)   BMI 35.31 kg/m²       Labs:  Lab Results   Component Value Date    WBC 9.9 03/01/2024    NRBC 0.0 03/01/2024    RBC 4.26 03/01/2024    HGB 12.3 03/01/2024    HCT 38.2 03/01/2024     03/01/2024     Lab Results   Component Value Date    CALCIUM 8.9 03/01/2024    AST 13 03/01/2024    ALKPHOS 101 03/01/2024    BILITOT <0.2 03/01/2024    PROT 7.3 03/01/2024    ALBUMIN 4.2 03/01/2024     03/01/2024    K 4.2 03/01/2024     03/01/2024    CO2 24 03/01/2024    ANIONGAP 12 03/01/2024    BUN 6 (L) 03/01/2024    CREATININE 0.70 03/01/2024    UREACREAUR 11.7 04/23/2021    GLUCOSE 77 03/01/2024    ALT 14 03/01/2024    EGFR >90 03/01/2024     Lab Results   Component Value Date    CHOL 173 03/01/2024    TRIG 78 03/01/2024    HDL 49.0 (L) 03/01/2024    LDLCALC 108 03/01/2024     No results found for: \"MICROALBCREA\"  Lab Results   Component Value Date    TSH 1.68 03/01/2024     Lab Results   Component Value Date    [id number] 300 04/23/2021     Lab Results   Component Value Date    HGBA1C 6.3 06/10/2025         Physical Exam   General Appearance: pleasant, cooperative, no acute distress  HEENT: no chemosis, no proptosis, no lid lag, no lid retraction  Neck: no lymphadenopathy, no thyromegaly, no dominant thyroid nodules  Heart: no murmurs, regular rate and rhythm, S1 and S2  Lungs: no wheezes, no rhonci, no rales  Extremities: no lower " extremity swelling      Assessment/Plan   1. Type 2 diabetes mellitus with other specified complication, unspecified whether long term insulin use (Multi) (Primary)  -A1c ordered and reviewed  -labs/glycemic log reviewed  -labs ordered  -refills sent    -overall doing well, test 3X/week at different times of day  -stop adal as no longer helpful with many false low alerts    2. Mixed hyperlipidemia  -on statin and tolerating, repeat labs    3. Primary hypertension  -at target will follow off meds         Follow Up:  Shad 6 months    -labs/tests/notes reviewed  -reviewed and counseled patient on medication monitoring and side effects

## 2025-06-10 ENCOUNTER — APPOINTMENT (OUTPATIENT)
Dept: ENDOCRINOLOGY | Facility: CLINIC | Age: 40
End: 2025-06-10
Payer: COMMERCIAL

## 2025-06-10 VITALS
BODY MASS INDEX: 35.31 KG/M2 | SYSTOLIC BLOOD PRESSURE: 131 MMHG | HEART RATE: 77 BPM | WEIGHT: 232.2 LBS | DIASTOLIC BLOOD PRESSURE: 83 MMHG

## 2025-06-10 DIAGNOSIS — E78.2 MIXED HYPERLIPIDEMIA: ICD-10-CM

## 2025-06-10 DIAGNOSIS — E11.69 TYPE 2 DIABETES MELLITUS WITH OTHER SPECIFIED COMPLICATION, UNSPECIFIED WHETHER LONG TERM INSULIN USE (MULTI): Primary | ICD-10-CM

## 2025-06-10 DIAGNOSIS — I10 PRIMARY HYPERTENSION: ICD-10-CM

## 2025-06-10 LAB — POC HEMOGLOBIN A1C: 6.3 % (ref 4.2–6.5)

## 2025-06-10 PROCEDURE — 3075F SYST BP GE 130 - 139MM HG: CPT | Performed by: INTERNAL MEDICINE

## 2025-06-10 PROCEDURE — 83036 HEMOGLOBIN GLYCOSYLATED A1C: CPT | Performed by: INTERNAL MEDICINE

## 2025-06-10 PROCEDURE — 3044F HG A1C LEVEL LT 7.0%: CPT | Performed by: INTERNAL MEDICINE

## 2025-06-10 PROCEDURE — 99214 OFFICE O/P EST MOD 30 MIN: CPT | Performed by: INTERNAL MEDICINE

## 2025-06-10 PROCEDURE — 3079F DIAST BP 80-89 MM HG: CPT | Performed by: INTERNAL MEDICINE

## 2025-06-10 PROCEDURE — 4004F PT TOBACCO SCREEN RCVD TLK: CPT | Performed by: INTERNAL MEDICINE

## 2025-06-10 RX ORDER — ATORVASTATIN CALCIUM 10 MG/1
10 TABLET, FILM COATED ORAL DAILY
Qty: 90 TABLET | Refills: 3 | Status: SHIPPED | OUTPATIENT
Start: 2025-06-10 | End: 2026-06-10

## 2025-06-10 RX ORDER — DULAGLUTIDE 4.5 MG/.5ML
4.5 INJECTION, SOLUTION SUBCUTANEOUS
Qty: 2 ML | Refills: 11 | Status: SHIPPED | OUTPATIENT
Start: 2025-06-10

## 2025-06-10 ASSESSMENT — PAIN SCALES - GENERAL: PAINLEVEL_OUTOF10: 0-NO PAIN

## 2025-06-10 ASSESSMENT — PATIENT HEALTH QUESTIONNAIRE - PHQ9
SUM OF ALL RESPONSES TO PHQ9 QUESTIONS 1 AND 2: 2
1. LITTLE INTEREST OR PLEASURE IN DOING THINGS: SEVERAL DAYS
2. FEELING DOWN, DEPRESSED OR HOPELESS: SEVERAL DAYS

## 2025-06-10 ASSESSMENT — ENCOUNTER SYMPTOMS: DEPRESSION: 1

## 2025-06-16 DIAGNOSIS — E11.69 TYPE 2 DIABETES MELLITUS WITH OTHER SPECIFIED COMPLICATION, UNSPECIFIED WHETHER LONG TERM INSULIN USE (MULTI): Primary | ICD-10-CM

## 2025-06-16 RX ORDER — FLUCONAZOLE 150 MG/1
150 TABLET ORAL ONCE
Qty: 1 TABLET | Refills: 1 | Status: SHIPPED | OUTPATIENT
Start: 2025-06-16 | End: 2025-06-16

## 2025-08-08 LAB
ALBUMIN SERPL-MCNC: 4.4 G/DL (ref 3.6–5.1)
ALBUMIN/CREAT UR: 6 MG/G CREAT
ALP SERPL-CCNC: 70 U/L (ref 31–125)
ALT SERPL-CCNC: 18 U/L (ref 6–29)
ANION GAP SERPL CALCULATED.4IONS-SCNC: 9 MMOL/L (CALC) (ref 7–17)
AST SERPL-CCNC: 14 U/L (ref 10–30)
BASOPHILS # BLD AUTO: 52 CELLS/UL (ref 0–200)
BASOPHILS NFR BLD AUTO: 0.5 %
BILIRUB SERPL-MCNC: 0.4 MG/DL (ref 0.2–1.2)
BUN SERPL-MCNC: 8 MG/DL (ref 7–25)
CALCIUM SERPL-MCNC: 8.8 MG/DL (ref 8.6–10.2)
CHLORIDE SERPL-SCNC: 104 MMOL/L (ref 98–110)
CHOLEST SERPL-MCNC: 160 MG/DL
CHOLEST/HDLC SERPL: 3.1 (CALC)
CO2 SERPL-SCNC: 26 MMOL/L (ref 20–32)
CREAT SERPL-MCNC: 0.67 MG/DL (ref 0.5–0.97)
CREAT UR-MCNC: 221 MG/DL (ref 20–275)
EGFRCR SERPLBLD CKD-EPI 2021: 114 ML/MIN/1.73M2
EOSINOPHIL # BLD AUTO: 260 CELLS/UL (ref 15–500)
EOSINOPHIL NFR BLD AUTO: 2.5 %
ERYTHROCYTE [DISTWIDTH] IN BLOOD BY AUTOMATED COUNT: 13.3 % (ref 11–15)
GLUCOSE SERPL-MCNC: 93 MG/DL (ref 65–99)
HCT VFR BLD AUTO: 40.8 % (ref 35–45)
HDLC SERPL-MCNC: 51 MG/DL
HGB BLD-MCNC: 13.4 G/DL (ref 11.7–15.5)
LDLC SERPL CALC-MCNC: 93 MG/DL (CALC)
LYMPHOCYTES # BLD AUTO: 4410 CELLS/UL (ref 850–3900)
LYMPHOCYTES NFR BLD AUTO: 42.4 %
MCH RBC QN AUTO: 31.9 PG (ref 27–33)
MCHC RBC AUTO-ENTMCNC: 32.8 G/DL (ref 32–36)
MCV RBC AUTO: 97.1 FL (ref 80–100)
MICROALBUMIN UR-MCNC: 1.3 MG/DL
MONOCYTES # BLD AUTO: 645 CELLS/UL (ref 200–950)
MONOCYTES NFR BLD AUTO: 6.2 %
NEUTROPHILS # BLD AUTO: 5034 CELLS/UL (ref 1500–7800)
NEUTROPHILS NFR BLD AUTO: 48.4 %
NONHDLC SERPL-MCNC: 109 MG/DL (CALC)
PLATELET # BLD AUTO: 343 THOUSAND/UL (ref 140–400)
PMV BLD REES-ECKER: 10.6 FL (ref 7.5–12.5)
POTASSIUM SERPL-SCNC: 4.1 MMOL/L (ref 3.5–5.3)
PROT SERPL-MCNC: 7.1 G/DL (ref 6.1–8.1)
RBC # BLD AUTO: 4.2 MILLION/UL (ref 3.8–5.1)
SODIUM SERPL-SCNC: 139 MMOL/L (ref 135–146)
TRIGL SERPL-MCNC: 73 MG/DL
WBC # BLD AUTO: 10.4 THOUSAND/UL (ref 3.8–10.8)

## 2025-08-11 ENCOUNTER — HOSPITAL ENCOUNTER (OUTPATIENT)
Dept: RADIOLOGY | Facility: HOSPITAL | Age: 40
Discharge: HOME | End: 2025-08-11
Payer: COMMERCIAL

## 2025-08-11 DIAGNOSIS — N76.0 ACUTE VAGINITIS: ICD-10-CM

## 2025-08-11 DIAGNOSIS — R10.2 PELVIC PAIN IN FEMALE: ICD-10-CM

## 2025-08-11 PROCEDURE — 76856 US EXAM PELVIC COMPLETE: CPT

## 2025-08-11 PROCEDURE — 76830 TRANSVAGINAL US NON-OB: CPT | Performed by: RADIOLOGY

## 2025-08-11 PROCEDURE — 76856 US EXAM PELVIC COMPLETE: CPT | Performed by: RADIOLOGY

## 2025-12-10 ENCOUNTER — APPOINTMENT (OUTPATIENT)
Facility: CLINIC | Age: 40
End: 2025-12-10
Payer: COMMERCIAL